# Patient Record
Sex: FEMALE | Race: WHITE | NOT HISPANIC OR LATINO | Employment: OTHER | URBAN - NONMETROPOLITAN AREA
[De-identification: names, ages, dates, MRNs, and addresses within clinical notes are randomized per-mention and may not be internally consistent; named-entity substitution may affect disease eponyms.]

---

## 2022-06-17 ENCOUNTER — TELEPHONE (OUTPATIENT)
Dept: FAMILY MEDICINE CLINIC | Facility: CLINIC | Age: 67
End: 2022-06-17

## 2022-06-17 RX ORDER — LEVOTHYROXINE SODIUM 112 UG/1
112 TABLET ORAL DAILY
Qty: 90 TABLET | Refills: 0 | Status: SHIPPED | OUTPATIENT
Start: 2022-06-17 | End: 2022-09-28 | Stop reason: SDUPTHER

## 2022-06-17 RX ORDER — LEVOTHYROXINE SODIUM 112 UG/1
112 TABLET ORAL DAILY
COMMUNITY
End: 2022-06-17 | Stop reason: SDUPTHER

## 2022-06-17 RX ORDER — CELECOXIB 200 MG/1
200 CAPSULE ORAL DAILY
COMMUNITY
End: 2022-06-17 | Stop reason: SDUPTHER

## 2022-06-17 RX ORDER — CELECOXIB 200 MG/1
200 CAPSULE ORAL DAILY
Qty: 90 CAPSULE | Refills: 0 | Status: SHIPPED | OUTPATIENT
Start: 2022-06-17 | End: 2022-09-28 | Stop reason: SDUPTHER

## 2022-09-27 RX ORDER — LEVOTHYROXINE SODIUM 112 UG/1
112 TABLET ORAL DAILY
Qty: 90 TABLET | Refills: 0 | OUTPATIENT
Start: 2022-09-27

## 2022-09-27 RX ORDER — CELECOXIB 200 MG/1
200 CAPSULE ORAL DAILY
Qty: 90 CAPSULE | Refills: 0 | OUTPATIENT
Start: 2022-09-27

## 2022-09-27 NOTE — TELEPHONE ENCOUNTER
Caller: Kyra Nunez    Relationship: Self    Best call back number: [phone]     Requested Prescriptions:   Requested Prescriptions     Pending Prescriptions Disp Refills   • celecoxib (CeleBREX) 200 MG capsule 90 capsule 0     Sig: Take 1 capsule by mouth Daily.   • levothyroxine (SYNTHROID, LEVOTHROID) 112 MCG tablet 90 tablet 0     Sig: Take 1 tablet by mouth Daily.        Pharmacy where request should be sent: [facility] #[id number] - [place], [place] - [address] [facility]  [address] [facility] - [phone] [facility] [phone]      Additional details provided by patient: PATIENT ALSO STATED THAT SHE NEEDED A REFILL FOR SUCRALFATE.     Does the patient have less than a 3 day supply:  [x] Yes  [] No    Rene Henriquez Rep   09/27/22 09:47 EDT

## 2022-09-28 ENCOUNTER — TELEPHONE (OUTPATIENT)
Dept: FAMILY MEDICINE CLINIC | Facility: CLINIC | Age: 67
End: 2022-09-28

## 2022-09-28 RX ORDER — LEVOTHYROXINE SODIUM 112 UG/1
112 TABLET ORAL DAILY
Qty: 30 TABLET | Refills: 0 | Status: SHIPPED | OUTPATIENT
Start: 2022-09-28 | End: 2022-11-16 | Stop reason: SDUPTHER

## 2022-09-28 RX ORDER — CELECOXIB 200 MG/1
200 CAPSULE ORAL DAILY
Qty: 30 CAPSULE | Refills: 0 | Status: SHIPPED | OUTPATIENT
Start: 2022-09-28 | End: 2022-11-16 | Stop reason: SDUPTHER

## 2022-09-28 NOTE — TELEPHONE ENCOUNTER
Caller: Kyra Nunez    Relationship: Self    Best call back number: [phone]    What is the best time to reach you: ANY     Who are you requesting to speak with (clinical staff, provider,  specific staff member): CLINICAL       What was th e call regarding: CHECKING ON MEDICATION REFILL. TOLD THAT IT CAN TAKE UP TO 48 HOURS     Do you require a callback: NO

## 2022-11-14 NOTE — TELEPHONE ENCOUNTER
Caller: Kyra Nunez    Relationship: Self    Best call back number: [phone]    Requested Prescriptions:   Requested Prescriptions     Pending Prescriptions Disp Refills   • celecoxib (CeleBREX) 200 MG capsule 30 capsule 0     Sig: Take 1 capsule by mouth Daily.   • levothyroxine (SYNTHROID, LEVOTHROID) 112 MCG tablet 30 tablet 0     Sig: Take 1 tablet by mouth Daily.        Pharmacy where request should be sent: [facility] #[id number] [address]  [address] - [phone] [facility] [phone]      Additional details provided by patient:     Does the patient have less than a 3 day supply:  [] Yes  [] No    Rene Garcia Rep   11/14/22 15:58 EST

## 2022-11-15 RX ORDER — LEVOTHYROXINE SODIUM 112 UG/1
112 TABLET ORAL DAILY
Qty: 30 TABLET | Refills: 0 | OUTPATIENT
Start: 2022-11-15

## 2022-11-15 RX ORDER — CELECOXIB 200 MG/1
200 CAPSULE ORAL DAILY
Qty: 30 CAPSULE | Refills: 0 | OUTPATIENT
Start: 2022-11-15

## 2022-11-16 ENCOUNTER — TELEPHONE (OUTPATIENT)
Dept: FAMILY MEDICINE CLINIC | Facility: CLINIC | Age: 67
End: 2022-11-16

## 2022-11-16 RX ORDER — CELECOXIB 200 MG/1
200 CAPSULE ORAL DAILY
Qty: 60 CAPSULE | Refills: 0 | Status: SHIPPED | OUTPATIENT
Start: 2022-11-16 | End: 2022-12-06 | Stop reason: SDUPTHER

## 2022-11-16 RX ORDER — LEVOTHYROXINE SODIUM 112 UG/1
112 TABLET ORAL DAILY
Qty: 60 TABLET | Refills: 0 | Status: SHIPPED | OUTPATIENT
Start: 2022-11-16 | End: 2022-12-06 | Stop reason: SDUPTHER

## 2022-11-16 NOTE — TELEPHONE ENCOUNTER
Patient is still waiting on her refill of levothyroxine and celecoxib. She would also like a confirmation call that it has been sent in.

## 2022-11-17 ENCOUNTER — TELEPHONE (OUTPATIENT)
Dept: FAMILY MEDICINE CLINIC | Facility: CLINIC | Age: 67
End: 2022-11-17

## 2022-11-17 RX ORDER — SUCRALFATE 1 G/1
1 TABLET ORAL 4 TIMES DAILY
Qty: 120 TABLET | Refills: 0 | Status: SHIPPED | OUTPATIENT
Start: 2022-11-17 | End: 2022-12-06

## 2022-11-17 NOTE — TELEPHONE ENCOUNTER
PT SAYS THAT SHE NEEDS THIS REFILLED  PLEASE..SUCRALFATE 1 GM QD 4 TIMES A DAY W/MEALS AND AT BEDTIME .COMPLETELY OUT ..

## 2022-11-17 NOTE — TELEPHONE ENCOUNTER
I sent in a refill on her Carafate.    Please have her keep her scheduled appointment with us for December 6, 2022.

## 2022-12-06 ENCOUNTER — OFFICE VISIT (OUTPATIENT)
Dept: FAMILY MEDICINE CLINIC | Facility: CLINIC | Age: 67
End: 2022-12-06

## 2022-12-06 VITALS
OXYGEN SATURATION: 96 % | DIASTOLIC BLOOD PRESSURE: 90 MMHG | HEART RATE: 65 BPM | HEIGHT: 64 IN | WEIGHT: 152 LBS | BODY MASS INDEX: 25.95 KG/M2 | SYSTOLIC BLOOD PRESSURE: 130 MMHG

## 2022-12-06 DIAGNOSIS — E03.9 HYPOTHYROIDISM (ACQUIRED): ICD-10-CM

## 2022-12-06 DIAGNOSIS — K22.70 BARRETT'S ESOPHAGUS WITHOUT DYSPLASIA: ICD-10-CM

## 2022-12-06 DIAGNOSIS — M15.9 GENERALIZED OSTEOARTHRITIS OF MULTIPLE SITES: ICD-10-CM

## 2022-12-06 DIAGNOSIS — Z12.11 SCREENING FOR COLON CANCER: ICD-10-CM

## 2022-12-06 DIAGNOSIS — E78.2 HYPERLIPIDEMIA, MIXED: ICD-10-CM

## 2022-12-06 DIAGNOSIS — K58.0 IRRITABLE BOWEL SYNDROME WITH DIARRHEA: Primary | ICD-10-CM

## 2022-12-06 DIAGNOSIS — K21.9 GERD WITHOUT ESOPHAGITIS: ICD-10-CM

## 2022-12-06 PROCEDURE — 99214 OFFICE O/P EST MOD 30 MIN: CPT | Performed by: FAMILY MEDICINE

## 2022-12-06 RX ORDER — LEVOTHYROXINE SODIUM 112 UG/1
112 TABLET ORAL DAILY
Qty: 90 TABLET | Refills: 1 | Status: SHIPPED | OUTPATIENT
Start: 2022-12-06 | End: 2023-01-17 | Stop reason: SDUPTHER

## 2022-12-06 RX ORDER — FAMOTIDINE 40 MG/1
40 TABLET, FILM COATED ORAL NIGHTLY
Qty: 90 TABLET | Refills: 1 | Status: SHIPPED | OUTPATIENT
Start: 2022-12-06 | End: 2023-01-17 | Stop reason: SDUPTHER

## 2022-12-06 RX ORDER — HYOSCYAMINE SULFATE 0.12 MG/1
0.12 TABLET SUBLINGUAL EVERY 4 HOURS PRN
Qty: 30 EACH | Refills: 5 | Status: SHIPPED | OUTPATIENT
Start: 2022-12-06 | End: 2023-01-17

## 2022-12-06 RX ORDER — OMEPRAZOLE 40 MG/1
40 CAPSULE, DELAYED RELEASE ORAL
Qty: 90 CAPSULE | Refills: 1 | Status: SHIPPED | OUTPATIENT
Start: 2022-12-06 | End: 2023-01-17 | Stop reason: SDUPTHER

## 2022-12-06 RX ORDER — CELECOXIB 200 MG/1
200 CAPSULE ORAL DAILY
Qty: 90 CAPSULE | Refills: 1 | Status: SHIPPED | OUTPATIENT
Start: 2022-12-06 | End: 2023-01-17 | Stop reason: SDUPTHER

## 2022-12-06 NOTE — ASSESSMENT & PLAN NOTE
Continue with statin treatment.  Will get fasting blood work up-to-date at follow-up and update her medication list at follow-up.    Lipid abnormalities are improving with treatment.  Pharmacotherapy as ordered.  Lipids will be reassessed Follow-up in 6 weeks for annual wellness visit and fasting labs.

## 2022-12-06 NOTE — ASSESSMENT & PLAN NOTE
Discussed further work-up and treatment.  She is interested in Cologuard given she declines endoscopic work-up at this time.    Given Levsin sublingual to use for cramping and bloating and restart treatment for GERD with omeprazole and discussed this is also important given Cole's history.  Also given famotidine at nighttime.    Recheck in 4 to 6 weeks for annual wellness visit or sooner problems arise.

## 2022-12-06 NOTE — PROGRESS NOTES
"Chief Complaint  Med Refill (Patient is waiting to discuss pain medication ) and Abdominal Pain (Patient would like to discuss IBS)    Subjective    History of Present Illness:  Kyra Nunez is a 67 y.o. female who presents today for follow-up regarding ongoing problems with crampy abdominal pain followed by diarrhea and upset stomach.    She has decided against repeat endoscopic work-up given some concerns with anesthesia.  She had a bad experience with anesthesia due to some underlying pneumonia that was unknown at the time that resulted in her waking up in the ICU.    No melena or hematochezia.  Past-due to get colon cancer screening done given she refuses follow-up endoscopic work-up.  She does have a history of Cole's and understands concerns with this and need for follow-up EGD but declines.    Willing to get Cologuard done for some colon cancer screening but understands this is really early detection and not screening.    Osteoarthritis remains manageable with Celebrex daily.  Failed other NSAIDs due to GI upset.    Levsin does help with abdominal cramping and bloating.    Continues on Synthroid for hypothyroidism.    Continues on lipid treatment and she is uncertain of her past lipid medication but will bring this with her to her follow-up visit and annual wellness visit next time.    Would like to restart PPI treatment for GERD.  She does have watery nonbloody diarrhea with flareups with reflux on a daily basis.  No dysphagia.    We did discuss she is past due for health maintenance and screening including DEXA, mammogram, colon cancer screening, low-dose lung CT for lung cancer screening, and vaccinations.    She will consider this at her follow-up visit for annual wellness.    Objective   Vital Signs:   /90 (BP Location: Left arm, Patient Position: Sitting, Cuff Size: Adult)   Pulse 65   Ht 162.6 cm (64\")   Wt 68.9 kg (152 lb)   SpO2 96%   BMI 26.09 kg/m²     Review of Systems "   Constitutional: Negative for appetite change, chills and fever.   HENT: Negative for hearing loss.    Eyes: Negative for blurred vision.   Respiratory: Negative for chest tightness.    Cardiovascular: Negative for chest pain.   Gastrointestinal: Positive for abdominal pain, diarrhea, nausea, GERD and indigestion. Negative for anal bleeding and blood in stool.   Musculoskeletal: Positive for arthralgias. Negative for gait problem.   Skin: Negative for rash.   Psychiatric/Behavioral: Negative for depressed mood.       Past History:  Medical History: has a past medical history of Cole's esophagus, Depressive disorder, Heavy menses, Hypothyroidism (acquired), IBS (irritable bowel syndrome), and Left breast lump.   Surgical History: has no past surgical history on file.   Family History: family history includes Lupus in her sister.   Social History: reports that she has been smoking cigarettes. She has a 50.00 pack-year smoking history. She does not have any smokeless tobacco history on file. She reports that she does not drink alcohol and does not use drugs.      Current Outpatient Medications:   •  celecoxib (CeleBREX) 200 MG capsule, Take 1 capsule by mouth Daily., Disp: 90 capsule, Rfl: 1  •  levothyroxine (SYNTHROID, LEVOTHROID) 112 MCG tablet, Take 1 tablet by mouth Daily., Disp: 90 tablet, Rfl: 1  •  famotidine (Pepcid) 40 MG tablet, Take 1 tablet by mouth Every Night. For stomach, Disp: 90 tablet, Rfl: 1  •  Hyoscyamine Sulfate SL (Levsin/SL) 0.125 MG sublingual tablet, Place 0.125 mg under the tongue Every 4 (Four) Hours As Needed (abdominal cramping/bloating). Caution constipation, Disp: 30 each, Rfl: 5  •  omeprazole (priLOSEC) 40 MG capsule, Take 1 capsule by mouth Every Morning Before Breakfast., Disp: 90 capsule, Rfl: 1    Allergies: Codeine, Copper-containing compounds, and Penicillins    Physical Exam  Constitutional:       Appearance: Normal appearance.   HENT:      Head: Normocephalic.      Right  Ear: External ear normal.      Left Ear: External ear normal.      Nose: Nose normal.   Eyes:      Pupils: Pupils are equal, round, and reactive to light.   Cardiovascular:      Rate and Rhythm: Normal rate and regular rhythm.      Heart sounds: Normal heart sounds.   Pulmonary:      Effort: Pulmonary effort is normal.      Breath sounds: Normal breath sounds.   Abdominal:      General: Abdomen is flat. Bowel sounds are normal.      Palpations: Abdomen is soft.   Musculoskeletal:         General: Normal range of motion.      Cervical back: Normal range of motion.   Skin:     General: Skin is warm and dry.   Neurological:      General: No focal deficit present.      Mental Status: She is alert.   Psychiatric:         Mood and Affect: Mood normal.         Behavior: Behavior normal.         Thought Content: Thought content normal.          Result Review                   Assessment and Plan  Diagnoses and all orders for this visit:    1. Irritable bowel syndrome with diarrhea (Primary)  Assessment & Plan:  Discussed further work-up and treatment.  She is interested in Cologuard given she declines endoscopic work-up at this time.    Given Levsin sublingual to use for cramping and bloating and restart treatment for GERD with omeprazole and discussed this is also important given Cole's history.  Also given famotidine at nighttime.    Recheck in 4 to 6 weeks for annual wellness visit or sooner problems arise.    Orders:  -     Hyoscyamine Sulfate SL (Levsin/SL) 0.125 MG sublingual tablet; Place 0.125 mg under the tongue Every 4 (Four) Hours As Needed (abdominal cramping/bloating). Caution constipation  Dispense: 30 each; Refill: 5    2. Hyperlipidemia, mixed  Assessment & Plan:  Continue with statin treatment.  Will get fasting blood work up-to-date at follow-up and update her medication list at follow-up.    Lipid abnormalities are improving with treatment.  Pharmacotherapy as ordered.  Lipids will be reassessed  Follow-up in 6 weeks for annual wellness visit and fasting labs.      3. Generalized osteoarthritis of multiple sites  Assessment & Plan:  Stable controlled Celebrex.  Refilled    Orders:  -     celecoxib (CeleBREX) 200 MG capsule; Take 1 capsule by mouth Daily.  Dispense: 90 capsule; Refill: 1    4. Hypothyroidism (acquired)  Assessment & Plan:  Continue Synthroid    Orders:  -     levothyroxine (SYNTHROID, LEVOTHROID) 112 MCG tablet; Take 1 tablet by mouth Daily.  Dispense: 90 tablet; Refill: 1    5. GERD without esophagitis  Assessment & Plan:  See above    Orders:  -     omeprazole (priLOSEC) 40 MG capsule; Take 1 capsule by mouth Every Morning Before Breakfast.  Dispense: 90 capsule; Refill: 1  -     famotidine (Pepcid) 40 MG tablet; Take 1 tablet by mouth Every Night. For stomach  Dispense: 90 tablet; Refill: 1    6. Cole's esophagus without dysplasia  -     omeprazole (priLOSEC) 40 MG capsule; Take 1 capsule by mouth Every Morning Before Breakfast.  Dispense: 90 capsule; Refill: 1  -     famotidine (Pepcid) 40 MG tablet; Take 1 tablet by mouth Every Night. For stomach  Dispense: 90 tablet; Refill: 1    7. Screening for colon cancer  -     Cologuard - Stool, Per Rectum; Future      BMI is >= 25 and <30. (Overweight) The following options were offered after discussion;: exercise counseling/recommendations and nutrition counseling/recommendations          Follow Up  Return in about 6 weeks (around 1/17/2023) for Annual physical, Medicare Wellness, Med recheck.    Theron Cooper MD

## 2023-01-17 ENCOUNTER — OFFICE VISIT (OUTPATIENT)
Dept: FAMILY MEDICINE CLINIC | Facility: CLINIC | Age: 68
End: 2023-01-17
Payer: MEDICARE

## 2023-01-17 VITALS
HEIGHT: 64 IN | DIASTOLIC BLOOD PRESSURE: 82 MMHG | BODY MASS INDEX: 26.12 KG/M2 | OXYGEN SATURATION: 94 % | SYSTOLIC BLOOD PRESSURE: 124 MMHG | HEART RATE: 60 BPM | WEIGHT: 153 LBS

## 2023-01-17 DIAGNOSIS — E03.9 HYPOTHYROIDISM (ACQUIRED): Chronic | ICD-10-CM

## 2023-01-17 DIAGNOSIS — K21.00 GASTROESOPHAGEAL REFLUX DISEASE WITH ESOPHAGITIS WITHOUT HEMORRHAGE: ICD-10-CM

## 2023-01-17 DIAGNOSIS — Z11.59 NEED FOR HEPATITIS C SCREENING TEST: ICD-10-CM

## 2023-01-17 DIAGNOSIS — Z13.820 SCREENING FOR OSTEOPOROSIS: ICD-10-CM

## 2023-01-17 DIAGNOSIS — K58.0 IRRITABLE BOWEL SYNDROME WITH DIARRHEA: Chronic | ICD-10-CM

## 2023-01-17 DIAGNOSIS — E78.2 HYPERLIPIDEMIA, MIXED: Chronic | ICD-10-CM

## 2023-01-17 DIAGNOSIS — Z23 NEEDS FLU SHOT: ICD-10-CM

## 2023-01-17 DIAGNOSIS — Z12.2 ENCOUNTER FOR SCREENING FOR LUNG CANCER: ICD-10-CM

## 2023-01-17 DIAGNOSIS — Z00.00 GENERAL MEDICAL EXAM: Primary | ICD-10-CM

## 2023-01-17 DIAGNOSIS — M15.9 GENERALIZED OSTEOARTHRITIS OF MULTIPLE SITES: Chronic | ICD-10-CM

## 2023-01-17 DIAGNOSIS — K22.70 BARRETT'S ESOPHAGUS WITHOUT DYSPLASIA: ICD-10-CM

## 2023-01-17 DIAGNOSIS — Z23 NEED FOR PROPHYLACTIC VACCINATION AGAINST STREPTOCOCCUS PNEUMONIAE (PNEUMOCOCCUS): ICD-10-CM

## 2023-01-17 DIAGNOSIS — Z87.891 PERSONAL HISTORY OF TOBACCO USE: ICD-10-CM

## 2023-01-17 DIAGNOSIS — Z12.31 SCREENING MAMMOGRAM FOR BREAST CANCER: ICD-10-CM

## 2023-01-17 PROCEDURE — 90677 PCV20 VACCINE IM: CPT | Performed by: FAMILY MEDICINE

## 2023-01-17 PROCEDURE — 1170F FXNL STATUS ASSESSED: CPT | Performed by: FAMILY MEDICINE

## 2023-01-17 PROCEDURE — 96160 PT-FOCUSED HLTH RISK ASSMT: CPT | Performed by: FAMILY MEDICINE

## 2023-01-17 PROCEDURE — G0402 INITIAL PREVENTIVE EXAM: HCPCS | Performed by: FAMILY MEDICINE

## 2023-01-17 PROCEDURE — 1159F MED LIST DOCD IN RCRD: CPT | Performed by: FAMILY MEDICINE

## 2023-01-17 PROCEDURE — 99397 PER PM REEVAL EST PAT 65+ YR: CPT | Performed by: FAMILY MEDICINE

## 2023-01-17 PROCEDURE — 90662 IIV NO PRSV INCREASED AG IM: CPT | Performed by: FAMILY MEDICINE

## 2023-01-17 PROCEDURE — G0008 ADMIN INFLUENZA VIRUS VAC: HCPCS | Performed by: FAMILY MEDICINE

## 2023-01-17 PROCEDURE — G0009 ADMIN PNEUMOCOCCAL VACCINE: HCPCS | Performed by: FAMILY MEDICINE

## 2023-01-17 PROCEDURE — 99213 OFFICE O/P EST LOW 20 MIN: CPT | Performed by: FAMILY MEDICINE

## 2023-01-17 RX ORDER — DEXTROMETHORPHAN HYDROBROMIDE AND PROMETHAZINE HYDROCHLORIDE 15; 6.25 MG/5ML; MG/5ML
5 SYRUP ORAL 4 TIMES DAILY PRN
Qty: 240 ML | Refills: 3 | Status: SHIPPED | OUTPATIENT
Start: 2023-01-17

## 2023-01-17 RX ORDER — OMEPRAZOLE 40 MG/1
40 CAPSULE, DELAYED RELEASE ORAL
Qty: 90 CAPSULE | Refills: 1 | Status: SHIPPED | OUTPATIENT
Start: 2023-01-17 | End: 2023-03-21

## 2023-01-17 RX ORDER — LEVOTHYROXINE SODIUM 112 UG/1
112 TABLET ORAL DAILY
Qty: 90 TABLET | Refills: 1 | Status: SHIPPED | OUTPATIENT
Start: 2023-01-17

## 2023-01-17 RX ORDER — FAMOTIDINE 40 MG/1
40 TABLET, FILM COATED ORAL NIGHTLY
Qty: 90 TABLET | Refills: 1 | Status: SHIPPED | OUTPATIENT
Start: 2023-01-17

## 2023-01-17 RX ORDER — CELECOXIB 200 MG/1
200 CAPSULE ORAL DAILY
Qty: 90 CAPSULE | Refills: 1 | Status: SHIPPED | OUTPATIENT
Start: 2023-01-17

## 2023-01-17 NOTE — PROGRESS NOTES
QUICK REFERENCE INFORMATION:  The ABCs of the Annual Wellness Visit    Subsequent Medicare Wellness Visit    @[email or web address]@    HEALTH RISK ASSESSMENT    1955    Recent Hospitalizations:  No hospitalization(s) within the last year..        Current Medical Providers:  Patient Care Team:  Theron Cooper MD as PCP - General (Family Medicine)        Smoking Status:  Social History     Tobacco Use   Smoking Status Every Day   • Packs/day: 1.00   • Years: 50.00   • Pack years: 50.00   • Types: Cigarettes   Smokeless Tobacco Never       Alcohol Consumption:  Social History     Substance and Sexual Activity   Alcohol Use Never       Depression Screen:   PHQ-2/PHQ-9 Depression Screening 12/6/2022   Little Interest or Pleasure in Doing Things 0-->not at all   Feeling Down, Depressed or Hopeless 0-->not at all   PHQ-9: Brief Depression Severity Measure Score 0       Health Habits and Functional and Cognitive Screening:  Functional & Cognitive Status 1/17/2023   Do you have difficulty preparing food and eating? No   Do you have difficulty bathing yourself, getting dressed or grooming yourself? No   Do you have difficulty using the toilet? No   Do you have difficulty moving around from place to place? No   Do you have trouble with steps or getting out of a bed or a chair? No   Current Diet Other   Dental Exam Not up to date   Eye Exam Up to date   Exercise (times per week) 0 times per week   Do you need help using the phone?  No   Are you deaf or do you have serious difficulty hearing?  No   Do you need help with transportation? No   Do you need help shopping? No   Do you need help preparing meals?  No   Do you need help with housework?  No   Do you need help with laundry? No   Do you need help taking your medications? No   Do you need help managing money? No   Do you ever drive or ride in a car without wearing a seat belt? No   Have you felt unusual stress, anger or loneliness in the last month? No   Who do you live  with? Spouse   If you need help, do you have trouble finding someone available to you? No   Have you been bothered in the last four weeks by sexual problems? No   Do you have difficulty concentrating, remembering or making decisions? No       Fall Risk Screen:  ZHANG Fall Risk Assessment was completed, and patient is at LOW risk for falls.Assessment completed on:1/17/2023    ACE III MINI        Does the patient have evidence of cognitive impairment? No    Aspirin use counseling: Does not need ASA (and currently is not on it)    Recent Lab Results:  CMP:     HbA1c:  No results found for: HGBA1C  Microalbumin:  No results found for: MICROALBUR, POCMALB, POCCREAT  Lipid Panel  No results found for: CHOL, TRIG, HDL, LDL, AST, ALT    Visual Acuity:  No results found.    Age-appropriate Screening Schedule:  Refer to the list below for future screening recommendations based on patient's age, sex and/or medical conditions. Orders for these recommended tests are listed in the plan section. The patient has been provided with a written plan.    Health Maintenance   Topic Date Due   • LIPID PANEL  Never done   • DXA SCAN  01/17/2023 (Originally 3/6/2008)   • MAMMOGRAM  01/17/2024 (Originally 9/8/2013)   • INFLUENZA VACCINE  Completed   • TDAP/TD VACCINES  Discontinued   • ZOSTER VACCINE  Discontinued        Subjective   History of Present Illness    Kyra Nunez is a 67 y.o. female who presents for her welcome to Medicare annual wellness visit and physical exam.    Still having upper GI symptoms despite addition of famotidine to omeprazole and use of Levsin for cramping and bloating.  She does have a history of past Cole's and required dilation with prior EGD.  She would like referral back to gastroenterology to discuss ongoing abdominal symptoms and consider EGD with dilation.    She continues to smoke and will not quit.  She has been reluctant in the past for low-dose lung CT but is willing to get that scheduled!    We  did discuss past-due for mammogram and bone density evaluation and she declines at this time but will consider in the future.    Discussed vaccination options and she is willing for flu shot and Prevnar 20 today.  She declines Tdap, Shingrix, and COVID booster.    Discussed and encouraged hep C screening and she would like that today.    Osteoarthritis stable with Celebrex.    Continues on Synthroid for hypothyroidism.    Mild stomach upset and occasional cough at nighttime and would like some Phenergan DM for cough congestion.    Declines colonoscopy.  Negative Cologuard December 2022.  Plans for repeat Cologuard in December 2025.    She does not have an advance directive but will consider    CHRONIC CONDITIONS    The following portions of the patient's history were reviewed and updated as appropriate: allergies, current medications, past family history, past medical history, past social history, past surgical history and problem list.    Outpatient Medications Prior to Visit   Medication Sig Dispense Refill   • celecoxib (CeleBREX) 200 MG capsule Take 1 capsule by mouth Daily. 90 capsule 1   • famotidine (Pepcid) 40 MG tablet Take 1 tablet by mouth Every Night. For stomach 90 tablet 1   • Hyoscyamine Sulfate SL (Levsin/SL) 0.125 MG sublingual tablet Place 0.125 mg under the tongue Every 4 (Four) Hours As Needed (abdominal cramping/bloating). Caution constipation 30 each 5   • levothyroxine (SYNTHROID, LEVOTHROID) 112 MCG tablet Take 1 tablet by mouth Daily. 90 tablet 1   • omeprazole (priLOSEC) 40 MG capsule Take 1 capsule by mouth Every Morning Before Breakfast. 90 capsule 1     No facility-administered medications prior to visit.       Patient Active Problem List   Diagnosis   • Hyperlipidemia, mixed   • Generalized osteoarthritis of multiple sites   • Irritable bowel syndrome with diarrhea   • Hypothyroidism (acquired)   • GERD without esophagitis   • Cole's esophagus without dysplasia   • Screening for  colon cancer   • General medical exam   • Screening mammogram for breast cancer   • Screening for osteoporosis   • Personal history of tobacco use   • Encounter for screening for lung cancer       Advance Care Planning:  ACP discussion was held with the patient during this visit. Patient does not have an advance directive, information provided.    Identification of Risk Factors:  Risk factors include: Advance Directive Discussion  Breast Cancer/Mammogram Screening  Colon Cancer Screening  Fall Risk  Immunizations Discussed/Encouraged (specific immunizations; Influenza and Prevnar 20 (Pneumococcal 20-valent conjugate) )  Lung Cancer Risk.    Review of Systems   Constitutional: Negative for appetite change, chills, fever and unexpected weight change.   HENT: Negative for hearing loss.    Eyes: Negative for visual disturbance.   Respiratory: Positive for cough. Negative for chest tightness, shortness of breath and wheezing.    Cardiovascular: Negative for chest pain, palpitations and leg swelling.   Gastrointestinal: Positive for abdominal pain. Negative for constipation and diarrhea.        Upper abdominal pain and GERD symptoms despite PPI and Pepcid.  Willing for repeat GI eval given history of Cole's on prior EGD   Musculoskeletal: Positive for arthralgias. Negative for back pain and gait problem.   Skin: Negative for rash.   Neurological: Negative for dizziness and headaches.   Psychiatric/Behavioral: Negative for agitation and confusion. The patient is not nervous/anxious.        Compared to one year ago, the patient feels her physical health is the same.  Compared to one year ago, the patient feels her mental health is the same.    Objective     Physical Exam  Constitutional:       Appearance: Normal appearance.   HENT:      Head: Normocephalic.      Right Ear: External ear normal.      Left Ear: External ear normal.      Nose: Nose normal.   Eyes:      Pupils: Pupils are equal, round, and reactive to light.  "  Cardiovascular:      Rate and Rhythm: Normal rate and regular rhythm.      Heart sounds: Normal heart sounds.   Pulmonary:      Effort: Pulmonary effort is normal.      Breath sounds: Normal breath sounds.   Abdominal:      General: Abdomen is flat. Bowel sounds are normal.      Palpations: Abdomen is soft.   Musculoskeletal:      Cervical back: Normal range of motion.      Comments: Generalized osteoarthritis stable with Celebrex.   Skin:     General: Skin is warm and dry.   Neurological:      General: No focal deficit present.      Mental Status: She is alert.   Psychiatric:         Mood and Affect: Mood normal.         Behavior: Behavior normal.         Thought Content: Thought content normal.          Procedures     Vitals:    01/17/23 0928   BP: 124/82   BP Location: Left arm   Patient Position: Sitting   Cuff Size: Adult   Pulse: 60   SpO2: 94%   Weight: 69.4 kg (153 lb)   Height: 162.6 cm (64\")       BMI is >= 25 and <30. (Overweight) The following options were offered after discussion;: weight loss educational material (shared in after visit summary), exercise counseling/recommendations and nutrition counseling/recommendations      No results found for: WBC, HGB, HCT, MCV, PLT  No results found for: GLUCOSE, BUN, CREATININE, EGFRIFNONA, EGFRIFAFRI, BCR, K, CO2, CALCIUM, PROTENTOTREF, ALBUMIN, LABIL2, BILIRUBIN, AST, ALT  No results found for: TSH  No results found for: PSA  No results found for: CHOL, CHLPL, TRIG, HDL, LDL, LDLDIRECT    Assessment & Plan   Problem List Items Addressed This Visit        Cardiac and Vasculature    Hyperlipidemia, mixed (Chronic)    Current Assessment & Plan     Lipid abnormalities are improving with treatment.  Pharmacotherapy as ordered.  Lipids will be reassessed in 6 months.         Relevant Orders    CBC Auto Differential    Comprehensive Metabolic Panel    Lipid Panel    TSH    T4, Free       Endocrine and Metabolic    Hypothyroidism (acquired) (Chronic)    Current " Assessment & Plan     Continue Synthroid         Relevant Medications    levothyroxine (SYNTHROID, LEVOTHROID) 112 MCG tablet    Other Relevant Orders    CBC Auto Differential    Comprehensive Metabolic Panel    Lipid Panel    TSH    T4, Free       Gastrointestinal Abdominal     Irritable bowel syndrome with diarrhea (Chronic)    Current Assessment & Plan     Scheduling GI consultation given ongoing symptoms.  Declines colonoscopy will consider EGD.  Cologuard normal in December         GERD without esophagitis (Chronic)    Current Assessment & Plan     Discussed ongoing symptoms despite combination of omeprazole and famotidine along with as needed Levsin.    History of Cole's with EGD requiring dilation.    Continues to smoke and will not quit.    Schedule GI consultation to consider EGD with possible dilation         Relevant Medications    omeprazole (priLOSEC) 40 MG capsule    famotidine (Pepcid) 40 MG tablet    Cole's esophagus without dysplasia (Chronic)    Current Assessment & Plan     See above scheduling GI consultation         Relevant Medications    omeprazole (priLOSEC) 40 MG capsule    famotidine (Pepcid) 40 MG tablet    Other Relevant Orders    Ambulatory Referral to Gastroenterology       Health Encounters    General medical exam - Primary    Current Assessment & Plan     Reviewed together health maintenance and screening along with vaccination options at her annual wellness visit today.    Will get fasting blood work up-to-date.    She does continue on past lipid treatment medication but does not have the prescription with her for refills.  She will call for refills.    Continue Celebrex for osteoarthritis.  Continue Synthroid for hypothyroidism.  Continue omeprazole and famotidine for GERD.  Scheduling consultation with GI given history of Cole's and need for EGD with possible dilation.    Scheduling past-due low-dose lung CT for lung cancer screening.    Updated vaccinations with flu and  Prevnar 20.    Declines COVID booster, Shingrix, and Tdap.    Will continue to encourage past-due mammogram and DEXA at follow-up.    Smoking cessation advised    Cologuard up-to-date            Musculoskeletal and Injuries    Generalized osteoarthritis of multiple sites (Chronic)    Current Assessment & Plan     Continue Celebrex         Relevant Medications    celecoxib (CeleBREX) 200 MG capsule    Screening for osteoporosis    Current Assessment & Plan     Declines.  We will continue to encourage a follow-up            Pulmonary and Pneumonias    Encounter for screening for lung cancer    Relevant Orders     CT Chest Low Dose Cancer Screening WO       Tobacco    Personal history of tobacco use    Relevant Orders     CT Chest Low Dose Cancer Screening WO       Other    Screening mammogram for breast cancer    Current Assessment & Plan     Declines.  We will continue to encourage a follow-up        Other Visit Diagnoses     Needs flu shot        Relevant Orders    Fluzone High-Dose 65+yrs (4175-2479) (Completed)    Need for prophylactic vaccination against Streptococcus pneumoniae (pneumococcus)        Relevant Orders    Pneumococcal Conjugate Vaccine 20-Valent (PCV20) (Completed)    Need for hepatitis C screening test        Relevant Orders    HCV Antibody Rfx To Qnt PCR        Patient Self-Management and Personalized Health Advice  The patient has been provided with information about: diet and exercise and preventive services including:   · Annual Wellness Visit (AWV)  · Hepatitis C Virus Screening (beneficiaries must fall into one of the following categories to be eligible- high risk for HCV infection, born between 8399-3560, or history of blood transfusion before 1992)  · Influenza Vaccine and Administration  · Pneumococcal Vaccine and Administration.    Outpatient Encounter Medications as of 1/17/2023   Medication Sig Dispense Refill   • celecoxib (CeleBREX) 200 MG capsule Take 1 capsule by mouth Daily. 90  capsule 1   • famotidine (Pepcid) 40 MG tablet Take 1 tablet by mouth Every Night. For stomach 90 tablet 1   • levothyroxine (SYNTHROID, LEVOTHROID) 112 MCG tablet Take 1 tablet by mouth Daily. 90 tablet 1   • omeprazole (priLOSEC) 40 MG capsule Take 1 capsule by mouth Every Morning Before Breakfast. 90 capsule 1   • [DISCONTINUED] celecoxib (CeleBREX) 200 MG capsule Take 1 capsule by mouth Daily. 90 capsule 1   • [DISCONTINUED] famotidine (Pepcid) 40 MG tablet Take 1 tablet by mouth Every Night. For stomach 90 tablet 1   • [DISCONTINUED] Hyoscyamine Sulfate SL (Levsin/SL) 0.125 MG sublingual tablet Place 0.125 mg under the tongue Every 4 (Four) Hours As Needed (abdominal cramping/bloating). Caution constipation 30 each 5   • [DISCONTINUED] levothyroxine (SYNTHROID, LEVOTHROID) 112 MCG tablet Take 1 tablet by mouth Daily. 90 tablet 1   • [DISCONTINUED] omeprazole (priLOSEC) 40 MG capsule Take 1 capsule by mouth Every Morning Before Breakfast. 90 capsule 1   • promethazine-dextromethorphan (PROMETHAZINE-DM) 6.25-15 MG/5ML syrup Take 5 mL by mouth 4 (Four) Times a Day As Needed for Cough. 240 mL 3     No facility-administered encounter medications on file as of 1/17/2023.       Reviewed use of high risk medication in the elderly: yes  Reviewed for potential of harmful drug interactions in the elderly: yes    Diagnoses and all orders for this visit:    1. General medical exam (Primary)  Assessment & Plan:  Reviewed together health maintenance and screening along with vaccination options at her annual wellness visit today.    Will get fasting blood work up-to-date.    She does continue on past lipid treatment medication but does not have the prescription with her for refills.  She will call for refills.    Continue Celebrex for osteoarthritis.  Continue Synthroid for hypothyroidism.  Continue omeprazole and famotidine for GERD.  Scheduling consultation with GI given history of Cole's and need for EGD with possible  dilation.    Scheduling past-due low-dose lung CT for lung cancer screening.    Updated vaccinations with flu and Prevnar 20.    Declines COVID booster, Shingrix, and Tdap.    Will continue to encourage past-due mammogram and DEXA at follow-up.    Smoking cessation advised    Cologuard up-to-date      2. Screening mammogram for breast cancer  Assessment & Plan:  Declines.  We will continue to encourage a follow-up      3. Screening for osteoporosis  Assessment & Plan:  Declines.  We will continue to encourage a follow-up      4. Personal history of tobacco use  -      CT Chest Low Dose Cancer Screening WO; Future    5. Encounter for screening for lung cancer  -      CT Chest Low Dose Cancer Screening WO; Future    6. Hyperlipidemia, mixed  Assessment & Plan:  Lipid abnormalities are improving with treatment.  Pharmacotherapy as ordered.  Lipids will be reassessed in 6 months.    Orders:  -     CBC Auto Differential; Future  -     Comprehensive Metabolic Panel; Future  -     Lipid Panel; Future  -     TSH; Future  -     T4, Free; Future    7. Generalized osteoarthritis of multiple sites  Assessment & Plan:  Continue Celebrex    Orders:  -     celecoxib (CeleBREX) 200 MG capsule; Take 1 capsule by mouth Daily.  Dispense: 90 capsule; Refill: 1    8. Irritable bowel syndrome with diarrhea  Assessment & Plan:  Scheduling GI consultation given ongoing symptoms.  Declines colonoscopy will consider EGD.  Cologuard normal in December 9. Hypothyroidism (acquired)  Assessment & Plan:  Continue Synthroid    Orders:  -     levothyroxine (SYNTHROID, LEVOTHROID) 112 MCG tablet; Take 1 tablet by mouth Daily.  Dispense: 90 tablet; Refill: 1  -     CBC Auto Differential; Future  -     Comprehensive Metabolic Panel; Future  -     Lipid Panel; Future  -     TSH; Future  -     T4, Free; Future    10. Gastroesophageal reflux disease with esophagitis without hemorrhage  Assessment & Plan:  Discussed ongoing symptoms despite  combination of omeprazole and famotidine along with as needed Levsin.    History of Cole's with EGD requiring dilation.    Continues to smoke and will not quit.    Schedule GI consultation to consider EGD with possible dilation    Orders:  -     omeprazole (priLOSEC) 40 MG capsule; Take 1 capsule by mouth Every Morning Before Breakfast.  Dispense: 90 capsule; Refill: 1  -     famotidine (Pepcid) 40 MG tablet; Take 1 tablet by mouth Every Night. For stomach  Dispense: 90 tablet; Refill: 1  -     Ambulatory Referral to Gastroenterology    11. Cole's esophagus without dysplasia  Assessment & Plan:  See above scheduling GI consultation    Orders:  -     omeprazole (priLOSEC) 40 MG capsule; Take 1 capsule by mouth Every Morning Before Breakfast.  Dispense: 90 capsule; Refill: 1  -     famotidine (Pepcid) 40 MG tablet; Take 1 tablet by mouth Every Night. For stomach  Dispense: 90 tablet; Refill: 1  -     Ambulatory Referral to Gastroenterology    12. Needs flu shot  -     Fluzone High-Dose 65+yrs ([id number])    13. Need for prophylactic vaccination against Streptococcus pneumoniae (pneumococcus)  -     Pneumococcal Conjugate Vaccine 20-Valent (PCV20)    14. Need for hepatitis C screening test  -     HCV Antibody Rfx To Qnt PCR; Future    Other orders  -     promethazine-dextromethorphan (PROMETHAZINE-DM) 6.25-15 MG/5ML syrup; Take 5 mL by mouth 4 (Four) Times a Day As Needed for Cough.  Dispense: 240 mL; Refill: 3      Follow Up:  Return in about 6 months (around 7/17/2023) for Med recheck.     There are no Patient Instructions on file for this visit.    An After Visit Summary and PPPS with all of these plans were given to the patient.

## 2023-01-17 NOTE — ASSESSMENT & PLAN NOTE
Discussed ongoing symptoms despite combination of omeprazole and famotidine along with as needed Levsin.    History of Cole's with EGD requiring dilation.    Continues to smoke and will not quit.    Schedule GI consultation to consider EGD with possible dilation

## 2023-01-17 NOTE — ASSESSMENT & PLAN NOTE
Scheduling GI consultation given ongoing symptoms.  Declines colonoscopy will consider EGD.  Cologuard normal in December

## 2023-01-17 NOTE — ASSESSMENT & PLAN NOTE
Reviewed together health maintenance and screening along with vaccination options at her annual wellness visit today.    Will get fasting blood work up-to-date.    She does continue on past lipid treatment medication but does not have the prescription with her for refills.  She will call for refills.    Continue Celebrex for osteoarthritis.  Continue Synthroid for hypothyroidism.  Continue omeprazole and famotidine for GERD.  Scheduling consultation with GI given history of Cole's and need for EGD with possible dilation.    Scheduling past-due low-dose lung CT for lung cancer screening.    Updated vaccinations with flu and Prevnar 20.    Declines COVID booster, Shingrix, and Tdap.    Will continue to encourage past-due mammogram and DEXA at follow-up.    Smoking cessation advised    Cologuard up-to-date

## 2023-03-21 ENCOUNTER — OUTSIDE FACILITY SERVICE (OUTPATIENT)
Dept: GASTROENTEROLOGY | Facility: CLINIC | Age: 68
End: 2023-03-21
Payer: MEDICARE

## 2023-03-21 PROCEDURE — 88305 TISSUE EXAM BY PATHOLOGIST: CPT

## 2023-03-21 PROCEDURE — 43239 EGD BIOPSY SINGLE/MULTIPLE: CPT | Performed by: INTERNAL MEDICINE

## 2023-03-21 PROCEDURE — 99204 OFFICE O/P NEW MOD 45 MIN: CPT | Performed by: INTERNAL MEDICINE

## 2023-03-21 PROCEDURE — 43249 ESOPH EGD DILATION <30 MM: CPT | Performed by: INTERNAL MEDICINE

## 2023-03-21 RX ORDER — METOCLOPRAMIDE 10 MG/1
TABLET ORAL
Qty: 90 TABLET | Refills: 2 | Status: SHIPPED | OUTPATIENT
Start: 2023-03-21

## 2023-03-21 RX ORDER — OMEPRAZOLE 40 MG/1
40 CAPSULE, DELAYED RELEASE ORAL
Qty: 60 CAPSULE | Refills: 11 | Status: SHIPPED | OUTPATIENT
Start: 2023-03-21

## 2023-03-22 ENCOUNTER — LAB REQUISITION (OUTPATIENT)
Dept: LAB | Facility: HOSPITAL | Age: 68
End: 2023-03-22
Payer: MEDICARE

## 2023-03-22 DIAGNOSIS — K22.70 BARRETT'S ESOPHAGUS WITHOUT DYSPLASIA: ICD-10-CM

## 2023-03-22 DIAGNOSIS — R13.10 DYSPHAGIA, UNSPECIFIED: ICD-10-CM

## 2023-03-22 DIAGNOSIS — K21.00 GASTRO-ESOPHAGEAL REFLUX DISEASE WITH ESOPHAGITIS, WITHOUT BLEEDING: ICD-10-CM

## 2023-03-22 DIAGNOSIS — K22.89 OTHER SPECIFIED DISEASE OF ESOPHAGUS: ICD-10-CM

## 2023-03-22 DIAGNOSIS — K31.89 OTHER DISEASES OF STOMACH AND DUODENUM: ICD-10-CM

## 2023-03-22 DIAGNOSIS — R12 HEARTBURN: ICD-10-CM

## 2023-03-24 LAB — REF LAB TEST METHOD: NORMAL

## 2023-07-24 RX ORDER — METOCLOPRAMIDE 10 MG/1
TABLET ORAL
Qty: 90 TABLET | Refills: 2 | Status: SHIPPED | OUTPATIENT
Start: 2023-07-24

## 2024-01-31 NOTE — TELEPHONE ENCOUNTER
Caller: Kyra Nunez    Relationship: Self    Best call back number: [phone]    Requested Prescriptions:   hyoscyamine sulfate 0.125 mg       Pharmacy where request should be sent:  [id number] [place], [place] - [address]  [address] [facility] - [phone]  - [phone] FX        Last office visit with prescribing clinician: 1/17/2023   Last telemedicine visit with prescribing clinician: Visit date not found   Next office visit with prescribing clinician: Visit date not found     Additional details provided by patient: PT STATED THAT RX IS FOR CRAMPS AND PCP PRESCRIBED HER RX.    Does the patient have less than a 3 day supply:  [x] Yes  [] No    Would you like a call back once the refill request has been completed: [] Yes [x] No    If the office needs to give you a call back, can they leave a voicemail: [] Yes [x] No    Rene Maguire Rep   01/31/24 09:32 EST

## 2024-02-13 ENCOUNTER — TELEPHONE (OUTPATIENT)
Dept: FAMILY MEDICINE CLINIC | Facility: CLINIC | Age: 69
End: 2024-02-13
Payer: MEDICARE

## 2024-02-19 ENCOUNTER — TELEPHONE (OUTPATIENT)
Dept: FAMILY MEDICINE CLINIC | Facility: CLINIC | Age: 69
End: 2024-02-19
Payer: MEDICARE

## 2024-02-19 DIAGNOSIS — M15.9 GENERALIZED OSTEOARTHRITIS OF MULTIPLE SITES: Chronic | ICD-10-CM

## 2024-02-19 DIAGNOSIS — E03.9 HYPOTHYROIDISM (ACQUIRED): Chronic | ICD-10-CM

## 2024-02-19 DIAGNOSIS — K21.00 GASTROESOPHAGEAL REFLUX DISEASE WITH ESOPHAGITIS WITHOUT HEMORRHAGE: ICD-10-CM

## 2024-02-19 DIAGNOSIS — K22.70 BARRETT'S ESOPHAGUS WITHOUT DYSPLASIA: ICD-10-CM

## 2024-02-19 RX ORDER — FAMOTIDINE 40 MG/1
40 TABLET, FILM COATED ORAL NIGHTLY
Qty: 90 TABLET | Refills: 0 | Status: SHIPPED | OUTPATIENT
Start: 2024-02-19

## 2024-02-19 RX ORDER — CELECOXIB 200 MG/1
200 CAPSULE ORAL DAILY
Qty: 90 CAPSULE | Refills: 0 | Status: SHIPPED | OUTPATIENT
Start: 2024-02-19

## 2024-02-19 RX ORDER — LEVOTHYROXINE SODIUM 112 UG/1
112 TABLET ORAL DAILY
Qty: 90 TABLET | Refills: 0 | Status: SHIPPED | OUTPATIENT
Start: 2024-02-19

## 2024-02-19 RX ORDER — OMEPRAZOLE 40 MG/1
40 CAPSULE, DELAYED RELEASE ORAL
Qty: 180 CAPSULE | Refills: 0 | Status: SHIPPED | OUTPATIENT
Start: 2024-02-19

## 2024-02-19 NOTE — TELEPHONE ENCOUNTER
Scheduled patient for first available appointment on 3/26. She is completely out of medication and wanting to know if she can get a refill to last until this appointment

## 2024-03-26 ENCOUNTER — PATIENT MESSAGE (OUTPATIENT)
Dept: FAMILY MEDICINE CLINIC | Facility: CLINIC | Age: 69
End: 2024-03-26

## 2024-03-26 ENCOUNTER — TRANSCRIBE ORDERS (OUTPATIENT)
Dept: MAMMOGRAPHY | Facility: CLINIC | Age: 69
End: 2024-03-26
Payer: MEDICARE

## 2024-03-26 ENCOUNTER — OFFICE VISIT (OUTPATIENT)
Dept: FAMILY MEDICINE CLINIC | Facility: CLINIC | Age: 69
End: 2024-03-26
Payer: MEDICARE

## 2024-03-26 DIAGNOSIS — R73.9 HYPERGLYCEMIA: ICD-10-CM

## 2024-03-26 DIAGNOSIS — F41.1 GENERALIZED ANXIETY DISORDER WITH PANIC ATTACKS: ICD-10-CM

## 2024-03-26 DIAGNOSIS — F32.9 REACTIVE DEPRESSION: ICD-10-CM

## 2024-03-26 DIAGNOSIS — K58.0 IRRITABLE BOWEL SYNDROME WITH DIARRHEA: Chronic | ICD-10-CM

## 2024-03-26 DIAGNOSIS — E78.2 HYPERLIPIDEMIA, MIXED: Chronic | ICD-10-CM

## 2024-03-26 DIAGNOSIS — Z87.891 PERSONAL HISTORY OF TOBACCO USE: ICD-10-CM

## 2024-03-26 DIAGNOSIS — Z12.31 SCREENING MAMMOGRAM FOR BREAST CANCER: ICD-10-CM

## 2024-03-26 DIAGNOSIS — K22.70 BARRETT'S ESOPHAGUS WITHOUT DYSPLASIA: Chronic | ICD-10-CM

## 2024-03-26 DIAGNOSIS — Z12.2 ENCOUNTER FOR SCREENING FOR LUNG CANCER: ICD-10-CM

## 2024-03-26 DIAGNOSIS — Z00.00 GENERAL MEDICAL EXAM: Primary | ICD-10-CM

## 2024-03-26 DIAGNOSIS — Z12.31 ENCOUNTER FOR SCREENING MAMMOGRAM FOR MALIGNANT NEOPLASM OF BREAST: Primary | ICD-10-CM

## 2024-03-26 DIAGNOSIS — E66.09 CLASS 1 OBESITY DUE TO EXCESS CALORIES WITH SERIOUS COMORBIDITY AND BODY MASS INDEX (BMI) OF 30.0 TO 30.9 IN ADULT: ICD-10-CM

## 2024-03-26 DIAGNOSIS — K21.9 GERD WITHOUT ESOPHAGITIS: Chronic | ICD-10-CM

## 2024-03-26 DIAGNOSIS — Z11.59 NEED FOR HEPATITIS C SCREENING TEST: ICD-10-CM

## 2024-03-26 DIAGNOSIS — F41.0 GENERALIZED ANXIETY DISORDER WITH PANIC ATTACKS: ICD-10-CM

## 2024-03-26 DIAGNOSIS — G43.009 MIGRAINE WITHOUT AURA AND WITHOUT STATUS MIGRAINOSUS, NOT INTRACTABLE: ICD-10-CM

## 2024-03-26 DIAGNOSIS — Z78.0 ENCOUNTER FOR OSTEOPOROSIS SCREENING IN ASYMPTOMATIC POSTMENOPAUSAL PATIENT: ICD-10-CM

## 2024-03-26 DIAGNOSIS — Z13.820 ENCOUNTER FOR OSTEOPOROSIS SCREENING IN ASYMPTOMATIC POSTMENOPAUSAL PATIENT: ICD-10-CM

## 2024-03-26 DIAGNOSIS — M15.9 GENERALIZED OSTEOARTHRITIS OF MULTIPLE SITES: Chronic | ICD-10-CM

## 2024-03-26 DIAGNOSIS — E03.9 HYPOTHYROIDISM (ACQUIRED): Chronic | ICD-10-CM

## 2024-03-26 PROBLEM — E66.811 CLASS 1 OBESITY DUE TO EXCESS CALORIES WITH SERIOUS COMORBIDITY AND BODY MASS INDEX (BMI) OF 30.0 TO 30.9 IN ADULT: Status: ACTIVE | Noted: 2024-03-26

## 2024-03-26 RX ORDER — LEVOTHYROXINE SODIUM 112 UG/1
112 TABLET ORAL DAILY
Qty: 90 TABLET | Refills: 2 | Status: SHIPPED | OUTPATIENT
Start: 2024-03-26 | End: 2024-03-27 | Stop reason: SDUPTHER

## 2024-03-26 RX ORDER — CELECOXIB 200 MG/1
200 CAPSULE ORAL DAILY
Qty: 90 CAPSULE | Refills: 2 | Status: SHIPPED | OUTPATIENT
Start: 2024-03-26

## 2024-03-26 RX ORDER — OMEPRAZOLE 40 MG/1
40 CAPSULE, DELAYED RELEASE ORAL
Qty: 180 CAPSULE | Refills: 2 | Status: SHIPPED | OUTPATIENT
Start: 2024-03-26

## 2024-03-26 RX ORDER — ROSUVASTATIN CALCIUM 20 MG/1
20 TABLET, COATED ORAL DAILY
Qty: 90 TABLET | Refills: 2 | Status: SHIPPED | OUTPATIENT
Start: 2024-03-26

## 2024-03-26 RX ORDER — FAMOTIDINE 40 MG/1
40 TABLET, FILM COATED ORAL NIGHTLY
Qty: 90 TABLET | Refills: 2 | Status: SHIPPED | OUTPATIENT
Start: 2024-03-26

## 2024-03-26 RX ORDER — DESVENLAFAXINE SUCCINATE 50 MG/1
50 TABLET, EXTENDED RELEASE ORAL DAILY
Qty: 90 TABLET | Refills: 2 | Status: SHIPPED | OUTPATIENT
Start: 2024-03-26

## 2024-03-26 NOTE — ASSESSMENT & PLAN NOTE
Discussed flareups with depression and anxiety related to increased stressors with her  dealing with lung cancer.  She did well with Effexor in the past and is interested in trying Pristiq given she is also noted migraine flareups off Effexor.  Started on low-dose Pristiq and we will plan to recheck at follow-up or sooner problems arise.  We did discuss room to titrate dosing to 100 mg if needed.

## 2024-03-26 NOTE — ASSESSMENT & PLAN NOTE
Patient's (Body mass index is 30.04 kg/m².) indicates that they are obese (BMI >30) with health conditions that include hypertension, dyslipidemias, GERD, and osteoarthritis . Weight is unchanged. BMI  is above average; BMI management plan is completed. We discussed portion control and increasing exercise.

## 2024-03-26 NOTE — TELEPHONE ENCOUNTER
From: Kyra Nunez  To: Theron Cooper  Sent: 3/26/2024 3:04 PM EDT  Subject: Medicine    What was the name of medicine u took me off of and they put my weight down wrong in chart

## 2024-03-26 NOTE — PROGRESS NOTES
The ABCs of the Annual Wellness Visit  Subsequent Medicare Wellness Visit    Subjective    Kyra Nunez is a 68 y.o. female who presents for a Subsequent Medicare Wellness Visit.    The following portions of the patient's history were reviewed and   updated as appropriate: allergies, current medications, past family history, past medical history, past social history, past surgical history, and problem list.    Compared to one year ago, the patient feels her physical   health is the same.    Compared to one year ago, the patient feels her mental   health is the same.    Recent Hospitalizations:  She was not admitted to the hospital during the last year.       Current Medical Providers:  Patient Care Team:  Theron Cooper MD as PCP - General (Family Medicine)    Outpatient Medications Prior to Visit   Medication Sig Dispense Refill    celecoxib (CeleBREX) 200 MG capsule Take 1 capsule by mouth Daily. 90 capsule 0    famotidine (Pepcid) 40 MG tablet Take 1 tablet by mouth Every Night. For stomach 90 tablet 0    hyoscyamine (LEVSIN) 0.125 MG SL tablet Place 1 tablet under the tongue Every 4 (Four) Hours As Needed for Cramping ((Caution constipation)). 20 tablet 3    levothyroxine (SYNTHROID, LEVOTHROID) 112 MCG tablet Take 1 tablet by mouth Daily. 90 tablet 0    metoclopramide (REGLAN) 10 MG tablet TAKE 1 TABLET BY MOUTH THREE TIMES DAILY 30 MINUTES BEFORE MEALS 90 tablet 2    omeprazole (priLOSEC) 40 MG capsule Take 1 capsule by mouth 2 (Two) Times a Day Before Meals. Take a half hour before breakfast 180 capsule 0     No facility-administered medications prior to visit.       No opioid medication identified on active medication list. I have reviewed chart for other potential  high risk medication/s and harmful drug interactions in the elderly.        Aspirin is not on active medication list.  Aspirin use is not indicated based on review of current medical condition/s. Risk of harm outweighs potential  "benefits.  .    Patient Active Problem List   Diagnosis    Hyperlipidemia, mixed    Generalized osteoarthritis of multiple sites    Irritable bowel syndrome with diarrhea    Hypothyroidism (acquired)    GERD without esophagitis    Cole's esophagus without dysplasia    Screening for colon cancer    General medical exam    Screening mammogram for breast cancer    Personal history of tobacco use    Encounter for screening for lung cancer    Encounter for osteoporosis screening in asymptomatic postmenopausal patient    Hyperglycemia    Class 1 obesity due to excess calories with serious comorbidity and body mass index (BMI) of 30.0 to 30.9 in adult    Generalized anxiety disorder with panic attacks    Migraine without aura and without status migrainosus, not intractable    Reactive depression     Advance Care Planning   Advance Care Planning     Advance Directive is not on file.  ACP discussion was held with the patient during this visit. Patient does not have an advance directive, information provided.     Objective    Vitals:    03/26/24 1327   BP: 130/70   Pulse: 67   SpO2: 97%   Weight: 79.4 kg (175 lb)     Estimated body mass index is 30.04 kg/m² as calculated from the following:    Height as of 1/17/23: 162.6 cm (64\").    Weight as of this encounter: 79.4 kg (175 lb).    BMI is >= 30 and <35. (Class 1 Obesity). The following options were offered after discussion;: exercise counseling/recommendations and nutrition counseling/recommendations      Does the patient have evidence of cognitive impairment? No          HEALTH RISK ASSESSMENT    Smoking Status:  Social History     Tobacco Use   Smoking Status Every Day    Current packs/day: 1.00    Average packs/day: 1 pack/day for 50.0 years (50.0 ttl pk-yrs)    Types: Cigarettes   Smokeless Tobacco Never     Alcohol Consumption:  Social History     Substance and Sexual Activity   Alcohol Use Never     Fall Risk Screen:    ZHANG Fall Risk Assessment was completed, and " patient is at LOW risk for falls.Assessment completed on:3/26/2024    Depression Screening:      3/26/2024     1:32 PM   PHQ-2/PHQ-9 Depression Screening   Little Interest or Pleasure in Doing Things 0-->not at all   Feeling Down, Depressed or Hopeless 0-->not at all   PHQ-9: Brief Depression Severity Measure Score 0       Health Habits and Functional and Cognitive Screening:      3/26/2024     1:00 PM   Functional & Cognitive Status   Do you have difficulty preparing food and eating? No   Do you have difficulty bathing yourself, getting dressed or grooming yourself? No   Do you have difficulty using the toilet? No   Do you have difficulty moving around from place to place? No   Do you have trouble with steps or getting out of a bed or a chair? No   Current Diet Limited Junk Food   Dental Exam Up to date   Eye Exam Up to date   Exercise (times per week) 0 times per week   Current Exercises Include No Regular Exercise;Gardening   Do you need help using the phone?  No   Are you deaf or do you have serious difficulty hearing?  No   Do you need help to go to places out of walking distance? No   Do you need help shopping? No   Do you need help preparing meals?  No   Do you need help with housework?  No   Do you need help with laundry? No   Do you need help taking your medications? No   Do you need help managing money? No   Do you ever drive or ride in a car without wearing a seat belt? No   Have you felt unusual stress, anger or loneliness in the last month? No   Who do you live with? Spouse   If you need help, do you have trouble finding someone available to you? No   Have you been bothered in the last four weeks by sexual problems? No   Do you have difficulty concentrating, remembering or making decisions? Yes       Age-appropriate Screening Schedule:  Refer to the list below for future screening recommendations based on patient's age, sex and/or medical conditions. Orders for these recommended tests are listed in the  plan section. The patient has been provided with a written plan.    Health Maintenance   Topic Date Due    LIPID PANEL  Never done    DXA SCAN  03/06/2008    MAMMOGRAM  09/08/2013    HEPATITIS C SCREENING  Never done    LUNG CANCER SCREENING  03/26/2024    INFLUENZA VACCINE  03/31/2024 (Originally 8/1/2023)    ANNUAL WELLNESS VISIT  03/26/2025    COLORECTAL CANCER SCREENING  12/18/2025    Pneumococcal Vaccine 65+  Completed    COVID-19 Vaccine  Discontinued    RSV Vaccine - Adults  Discontinued    TDAP/TD VACCINES  Discontinued    ZOSTER VACCINE  Discontinued                  CMS Preventative Services Quick Reference  Risk Factors Identified During Encounter  Fall Risk-High or Moderate: Discussed Fall Prevention in the home  Immunizations Discussed/Encouraged: Influenza and RSV (Respiratory Syncytial Virus)  The above risks/problems have been discussed with the patient.  Pertinent information has been shared with the patient in the After Visit Summary.  An After Visit Summary and PPPS were made available to the patient.    Follow Up:   Next Medicare Wellness visit to be scheduled in 1 year.       Additional E&M Note during same encounter follows:  Patient has multiple medical problems which are significant and separately identifiable that require additional work above and beyond the Medicare Wellness Visit.      Chief Complaint  IBS, Hypothyroidism, OA, GERD, anxiety/stress and migraine flareups    Subjective        HPI  Kyra Nunez is also being seen today for physical exam and medication refills    Still having upper GI symptoms despite addition of famotidine to omeprazole and use of Levsin for cramping and bloating.  She does have a history of past Cole's and required dilation with prior EGD.  Had EGD 3/2023 and they added reglan but she would like to come off reglan given TD risk.  On Omeprazole and pepcid.  No dysphagia.      She continues to smoke and will not quit.  She has been reluctant in the past for  low-dose lung CT but had one last year!  Willing to get setup for past-due low dose lung CT     is struggling with lung cancer.    Increased stress at home and migraines.  Did well with effexor except side-effects in the past - interested in trying pristiq.     We did discuss past-due for mammogram and bone density evaluation and she is willing to get them setup    Declines vaccination update    Osteoarthritis stable with Celebrex.    Continues on Synthroid for hypothyroidism.    Declines colonoscopy.  Negative Cologuard December 2022.  Plans for repeat Cologuard in December 2025.    She does not have an advance directive but will consider    Review of Systems   Constitutional:  Negative for activity change, appetite change, chills, fatigue and fever.   HENT:  Negative for ear pain, hearing loss and trouble swallowing.    Eyes:  Negative for pain and visual disturbance.   Respiratory:  Negative for cough, chest tightness, shortness of breath and wheezing.    Cardiovascular:  Negative for chest pain, palpitations and leg swelling.   Gastrointestinal:  Negative for abdominal pain and blood in stool.   Genitourinary:  Negative for difficulty urinating.   Musculoskeletal:  Negative for arthralgias, back pain and joint swelling.   Skin:  Negative for rash.   Neurological:  Negative for dizziness, weakness and light-headedness.   Psychiatric/Behavioral:  Positive for dysphoric mood. Negative for agitation, behavioral problems and sleep disturbance. The patient is nervous/anxious.        Objective   Vital Signs:  /70   Pulse 67   Wt 79.4 kg (175 lb)   SpO2 97%   BMI 30.04 kg/m²     Physical Exam  Constitutional:       Appearance: Normal appearance.   HENT:      Head: Normocephalic.      Right Ear: External ear normal.      Left Ear: External ear normal.      Nose: Nose normal.   Eyes:      Pupils: Pupils are equal, round, and reactive to light.   Cardiovascular:      Rate and Rhythm: Normal rate and  regular rhythm.      Heart sounds: Normal heart sounds.   Pulmonary:      Effort: Pulmonary effort is normal.      Breath sounds: Normal breath sounds.   Musculoskeletal:         General: Normal range of motion.      Cervical back: Normal range of motion.   Skin:     General: Skin is warm and dry.   Neurological:      General: No focal deficit present.      Mental Status: She is alert.   Psychiatric:      Comments: Increased flareups with stress and migraine headaches.  Interested in restarting treatment with Pristiq given she has done well with Effexor in the past but would prefer to try something with fewer side effects.                         Assessment and Plan   Diagnoses and all orders for this visit:    1. General medical exam (Primary)  Assessment & Plan:  Discussed together health maintenance and screening along with vaccination options and healthy diet and exercise habits as part of the preventative counseling at their physical exam today.       2. Screening mammogram for breast cancer  -     Mammo Screening Digital Tomosynthesis Bilateral With CAD; Future    3. Encounter for osteoporosis screening in asymptomatic postmenopausal patient  -     DEXA Bone Density Axial; Future    4. Irritable bowel syndrome with diarrhea  Assessment & Plan:  Continue with as needed Levsin    Orders:  -     hyoscyamine (LEVSIN) 0.125 MG SL tablet; Place 1 tablet under the tongue Every 4 (Four) Hours As Needed for Cramping ((Caution constipation)).  Dispense: 30 tablet; Refill: 6    5. Hypothyroidism (acquired)  Assessment & Plan:  Continue Synthroid    Orders:  -     CBC Auto Differential; Future  -     Comprehensive Metabolic Panel; Future  -     Lipid Panel; Future  -     TSH; Future  -     T4, Free; Future  -     levothyroxine (SYNTHROID, LEVOTHROID) 112 MCG tablet; Take 1 tablet by mouth Daily.  Dispense: 90 tablet; Refill: 2  -     CBC Auto Differential  -     Comprehensive Metabolic Panel  -     Lipid Panel  -      TSH  -     T4, Free    6. Hyperlipidemia, mixed  Assessment & Plan:  She is willing to restart cholesterol       Restarted treatment with Crestor    Orders:  -     CBC Auto Differential; Future  -     Comprehensive Metabolic Panel; Future  -     Lipid Panel; Future  -     TSH; Future  -     T4, Free; Future  -     rosuvastatin (Crestor) 20 MG tablet; Take 1 tablet by mouth Daily. For cholesterol and heart protection.  Dispense: 90 tablet; Refill: 2  -     CBC Auto Differential  -     Comprehensive Metabolic Panel  -     Lipid Panel  -     TSH  -     T4, Free    7. GERD without esophagitis  Assessment & Plan:  Refilled omeprazole    Orders:  -     omeprazole (priLOSEC) 40 MG capsule; Take 1 capsule by mouth 2 (Two) Times a Day Before Meals. Take a half hour before breakfast  Dispense: 180 capsule; Refill: 2  -     famotidine (Pepcid) 40 MG tablet; Take 1 tablet by mouth Every Night. For stomach  Dispense: 90 tablet; Refill: 2    8. Generalized osteoarthritis of multiple sites  Assessment & Plan:  Continue Celebrex    Orders:  -     celecoxib (CeleBREX) 200 MG capsule; Take 1 capsule by mouth Daily.  Dispense: 90 capsule; Refill: 2    9. Cole's esophagus without dysplasia  Assessment & Plan:  EGD uptodate 3/2023    Cont omeprazole and pepcid    Off Reglan given concerns for TD long-term     Orders:  -     omeprazole (priLOSEC) 40 MG capsule; Take 1 capsule by mouth 2 (Two) Times a Day Before Meals. Take a half hour before breakfast  Dispense: 180 capsule; Refill: 2  -     famotidine (Pepcid) 40 MG tablet; Take 1 tablet by mouth Every Night. For stomach  Dispense: 90 tablet; Refill: 2    10. Personal history of tobacco use  -      CT Chest Low Dose Cancer Screening WO; Future    11. Encounter for screening for lung cancer  -      CT Chest Low Dose Cancer Screening WO; Future    12. Need for hepatitis C screening test  -     HCV Antibody Rfx To Qnt PCR; Future  -     HCV Antibody Rfx To Qnt PCR    13.  Hyperglycemia  -     Hemoglobin A1c; Future  -     Hemoglobin A1c    14. Generalized anxiety disorder with panic attacks  Assessment & Plan:  Discussed flareups with depression and anxiety related to increased stressors with her  dealing with lung cancer.  She did well with Effexor in the past and is interested in trying Pristiq given she is also noted migraine flareups off Effexor.  Started on low-dose Pristiq and we will plan to recheck at follow-up or sooner problems arise.  We did discuss room to titrate dosing to 100 mg if needed.    Orders:  -     desvenlafaxine (Pristiq) 50 MG 24 hr tablet; Take 1 tablet by mouth Daily. For stress/anxiety/depression/migraine prevention  Dispense: 90 tablet; Refill: 2    15. Migraine without aura and without status migrainosus, not intractable  Assessment & Plan:  Restarting Pristiq for depression with anxiety and migraine prevention        Orders:  -     desvenlafaxine (Pristiq) 50 MG 24 hr tablet; Take 1 tablet by mouth Daily. For stress/anxiety/depression/migraine prevention  Dispense: 90 tablet; Refill: 2    16. Reactive depression  Assessment & Plan:  Discussed flareups with depression and anxiety related to increased stressors with her  dealing with lung cancer.  She did well with Effexor in the past and is interested in trying Pristiq given she is also noted migraine flareups off Effexor.  Started on low-dose Pristiq and we will plan to recheck at follow-up or sooner problems arise.  We did discuss room to titrate dosing to 100 mg if needed.    Orders:  -     desvenlafaxine (Pristiq) 50 MG 24 hr tablet; Take 1 tablet by mouth Daily. For stress/anxiety/depression/migraine prevention  Dispense: 90 tablet; Refill: 2    17. Class 1 obesity due to excess calories with serious comorbidity and body mass index (BMI) of 30.0 to 30.9 in adult  Assessment & Plan:  Patient's (Body mass index is 30.04 kg/m².) indicates that they are obese (BMI >30) with health  conditions that include hypertension, dyslipidemias, GERD, and osteoarthritis . Weight is unchanged. BMI  is above average; BMI management plan is completed. We discussed portion control and increasing exercise.                Follow Up   Return in about 6 months (around 9/26/2024) for Med recheck, Fasting for labs at appointment (but drink water!).  Patient was given instructions and counseling regarding her condition or for health maintenance advice. Please see specific information pulled into the AVS if appropriate.

## 2024-03-27 VITALS
DIASTOLIC BLOOD PRESSURE: 70 MMHG | WEIGHT: 137 LBS | SYSTOLIC BLOOD PRESSURE: 130 MMHG | OXYGEN SATURATION: 97 % | BODY MASS INDEX: 23.52 KG/M2 | HEART RATE: 67 BPM

## 2024-03-27 DIAGNOSIS — E78.2 HYPERLIPIDEMIA, MIXED: ICD-10-CM

## 2024-03-27 DIAGNOSIS — E03.9 HYPOTHYROIDISM (ACQUIRED): Primary | ICD-10-CM

## 2024-03-27 LAB
ALBUMIN SERPL-MCNC: 4.5 G/DL (ref 3.9–4.9)
ALBUMIN/GLOB SERPL: 1.6 {RATIO} (ref 1.2–2.2)
ALP SERPL-CCNC: 101 IU/L (ref 44–121)
ALT SERPL-CCNC: 12 IU/L (ref 0–32)
AST SERPL-CCNC: 13 IU/L (ref 0–40)
BASOPHILS # BLD AUTO: 0 X10E3/UL (ref 0–0.2)
BASOPHILS NFR BLD AUTO: 1 %
BILIRUB SERPL-MCNC: 0.4 MG/DL (ref 0–1.2)
BUN SERPL-MCNC: 14 MG/DL (ref 8–27)
BUN/CREAT SERPL: 14 (ref 12–28)
CALCIUM SERPL-MCNC: 9.6 MG/DL (ref 8.7–10.3)
CHLORIDE SERPL-SCNC: 103 MMOL/L (ref 96–106)
CHOLEST SERPL-MCNC: 223 MG/DL (ref 100–199)
CO2 SERPL-SCNC: 22 MMOL/L (ref 20–29)
CREAT SERPL-MCNC: 1.01 MG/DL (ref 0.57–1)
EGFRCR SERPLBLD CKD-EPI 2021: 61 ML/MIN/1.73
EOSINOPHIL # BLD AUTO: 0.1 X10E3/UL (ref 0–0.4)
EOSINOPHIL NFR BLD AUTO: 1 %
ERYTHROCYTE [DISTWIDTH] IN BLOOD BY AUTOMATED COUNT: 15 % (ref 11.7–15.4)
GLOBULIN SER CALC-MCNC: 2.8 G/DL (ref 1.5–4.5)
GLUCOSE SERPL-MCNC: 94 MG/DL (ref 70–99)
HBA1C MFR BLD: 6.1 % (ref 4.8–5.6)
HCT VFR BLD AUTO: 45.7 % (ref 34–46.6)
HCV AB SERPL QL IA: NORMAL
HCV IGG SERPL QL IA: NON REACTIVE
HDLC SERPL-MCNC: 59 MG/DL
HGB BLD-MCNC: 15.4 G/DL (ref 11.1–15.9)
IMM GRANULOCYTES # BLD AUTO: 0 X10E3/UL (ref 0–0.1)
IMM GRANULOCYTES NFR BLD AUTO: 0 %
LDLC SERPL CALC-MCNC: 147 MG/DL (ref 0–99)
LYMPHOCYTES # BLD AUTO: 2.5 X10E3/UL (ref 0.7–3.1)
LYMPHOCYTES NFR BLD AUTO: 32 %
MCH RBC QN AUTO: 28.1 PG (ref 26.6–33)
MCHC RBC AUTO-ENTMCNC: 33.7 G/DL (ref 31.5–35.7)
MCV RBC AUTO: 83 FL (ref 79–97)
MONOCYTES # BLD AUTO: 0.4 X10E3/UL (ref 0.1–0.9)
MONOCYTES NFR BLD AUTO: 6 %
NEUTROPHILS # BLD AUTO: 4.6 X10E3/UL (ref 1.4–7)
NEUTROPHILS NFR BLD AUTO: 60 %
PLATELET # BLD AUTO: 357 X10E3/UL (ref 150–450)
POTASSIUM SERPL-SCNC: 4.4 MMOL/L (ref 3.5–5.2)
PROT SERPL-MCNC: 7.3 G/DL (ref 6–8.5)
RBC # BLD AUTO: 5.48 X10E6/UL (ref 3.77–5.28)
SODIUM SERPL-SCNC: 139 MMOL/L (ref 134–144)
T4 FREE SERPL-MCNC: 1.87 NG/DL (ref 0.82–1.77)
TRIGL SERPL-MCNC: 96 MG/DL (ref 0–149)
TSH SERPL DL<=0.005 MIU/L-ACNC: 0.1 UIU/ML (ref 0.45–4.5)
VLDLC SERPL CALC-MCNC: 17 MG/DL (ref 5–40)
WBC # BLD AUTO: 7.7 X10E3/UL (ref 3.4–10.8)

## 2024-03-27 RX ORDER — LEVOTHYROXINE SODIUM 88 UG/1
88 TABLET ORAL
Qty: 90 TABLET | Refills: 2 | Status: SHIPPED | OUTPATIENT
Start: 2024-03-27

## 2024-04-01 NOTE — PROGRESS NOTES
The message below may be given by the hub:    Please contact patient with [facility] lab result message.    There is a lab result message attached to their lab results... but I received notification that the results were not viewed within 5 days on [facility].  I need to make sure that they get their lab result message.    Thank you.

## 2024-04-11 ENCOUNTER — TELEPHONE (OUTPATIENT)
Dept: FAMILY MEDICINE CLINIC | Facility: CLINIC | Age: 69
End: 2024-04-11
Payer: MEDICARE

## 2024-04-11 NOTE — TELEPHONE ENCOUNTER
----- Message from Theron Cooper MD sent at 4/6/2024 11:54 AM EDT -----  The message below may be given by the hub:    Please contact patient with [facility] low-dose lung CT result message.    There is a result message attached to their low-dose CT results... but I received notification that the results were not viewed within 5 days on [facility].  I need to make sure that they get their  result message.    Thank you.

## 2024-04-11 NOTE — TELEPHONE ENCOUNTER
Left Message: If Pt calls back ok for Hub to relay--  Your recent mammogram returned with no findings concerning for breast cancer.     We should plan to repeat this for breast cancer screening in 1 year.   Pt has viewed message on My Chart

## 2024-04-11 NOTE — TELEPHONE ENCOUNTER
MELIAM to return call to office # [phone]     FOR HUB:   Your recent low-dose lung CT for lung cancer screening returned good with no findings concerning for lung cancer.     We should plan to repeat this for lung cancer screening in 1 year.

## 2024-04-11 NOTE — TELEPHONE ENCOUNTER
Name: Kyra Nunez      Relationship: Self      Best Callback Number:  [phone]       HUB PROVIDED THE RELAY MESSAGE FROM THE OFFICE      PATIENT: VOICED UNDERSTANDING AND HAS NO FURTHER QUESTIONS AT THIS TIME    ADDITIONAL INFORMATION: N/A

## 2024-10-28 ENCOUNTER — TELEPHONE (OUTPATIENT)
Dept: FAMILY MEDICINE CLINIC | Facility: CLINIC | Age: 69
End: 2024-10-28

## 2025-03-05 DIAGNOSIS — E78.2 HYPERLIPIDEMIA, MIXED: Chronic | ICD-10-CM

## 2025-03-05 DIAGNOSIS — E03.9 HYPOTHYROIDISM (ACQUIRED): ICD-10-CM

## 2025-03-05 DIAGNOSIS — F32.9 REACTIVE DEPRESSION: ICD-10-CM

## 2025-03-05 DIAGNOSIS — F41.1 GENERALIZED ANXIETY DISORDER WITH PANIC ATTACKS: ICD-10-CM

## 2025-03-05 DIAGNOSIS — F41.0 GENERALIZED ANXIETY DISORDER WITH PANIC ATTACKS: ICD-10-CM

## 2025-03-05 DIAGNOSIS — M15.9 GENERALIZED OSTEOARTHRITIS OF MULTIPLE SITES: Chronic | ICD-10-CM

## 2025-03-05 DIAGNOSIS — G43.009 MIGRAINE WITHOUT AURA AND WITHOUT STATUS MIGRAINOSUS, NOT INTRACTABLE: ICD-10-CM

## 2025-03-05 RX ORDER — ROSUVASTATIN CALCIUM 20 MG/1
20 TABLET, COATED ORAL DAILY
Qty: 90 TABLET | Refills: 2 | Status: SHIPPED | OUTPATIENT
Start: 2025-03-05

## 2025-03-05 RX ORDER — DESVENLAFAXINE 50 MG/1
50 TABLET, FILM COATED, EXTENDED RELEASE ORAL DAILY
Qty: 90 TABLET | Refills: 2 | Status: SHIPPED | OUTPATIENT
Start: 2025-03-05

## 2025-03-05 RX ORDER — CELECOXIB 200 MG/1
200 CAPSULE ORAL DAILY
Qty: 90 CAPSULE | Refills: 2 | Status: SHIPPED | OUTPATIENT
Start: 2025-03-05

## 2025-03-05 RX ORDER — LEVOTHYROXINE SODIUM 88 UG/1
88 TABLET ORAL
Qty: 90 TABLET | Refills: 2 | OUTPATIENT
Start: 2025-03-05

## 2025-03-05 NOTE — TELEPHONE ENCOUNTER
Called pt's wife and advised of rad't this am for labs, fluids, np, she marsha, df   Protocol failed

## 2025-03-05 NOTE — TELEPHONE ENCOUNTER
Requested Prescriptions:   Requested Prescriptions     Pending Prescriptions Disp Refills    celecoxib (CeleBREX) 200 MG capsule 90 capsule 2     Sig: Take 1 capsule by mouth Daily.    rosuvastatin (Crestor) 20 MG tablet 90 tablet 2     Sig: Take 1 tablet by mouth Daily. For cholesterol and heart protection.    desvenlafaxine (Pristiq) 50 MG 24 hr tablet 90 tablet 2     Sig: Take 1 tablet by mouth Daily. For stress/anxiety/depression/migraine prevention    levothyroxine (SYNTHROID, LEVOTHROID) 88 MCG tablet 90 tablet 2     Sig: Take 1 tablet by mouth Every Morning. (Note dose reduction to 88mcg based upon labs)        Pharmacy where request should be sent:      Last office visit with prescribing clinician: 3/26/2024   Last telemedicine visit with prescribing clinician: Visit date not found   Next office visit with prescribing clinician: Visit date not found     Additional details provided by patient: faxed from [facility]     Does the patient have less than a 3 day supply:  [] Yes  [] No    Would you like a call back once the refill request has been completed: [] Yes [] No    If the office needs to give you a call back, can they leave a voicemail: [] Yes [] No    Rene Banda Rep   03/05/25 08:45 EST

## 2025-04-18 ENCOUNTER — HOSPITAL ENCOUNTER (EMERGENCY)
Facility: HOSPITAL | Age: 70
Discharge: HOME | End: 2025-04-18
Attending: STUDENT IN AN ORGANIZED HEALTH CARE EDUCATION/TRAINING PROGRAM
Payer: MEDICARE

## 2025-04-18 ENCOUNTER — OFFICE VISIT (OUTPATIENT)
Dept: FAMILY MEDICINE CLINIC | Facility: CLINIC | Age: 70
End: 2025-04-18
Payer: MEDICARE

## 2025-04-18 ENCOUNTER — APPOINTMENT (OUTPATIENT)
Dept: CT IMAGING | Facility: HOSPITAL | Age: 70
End: 2025-04-18
Payer: MEDICARE

## 2025-04-18 VITALS
HEART RATE: 73 BPM | SYSTOLIC BLOOD PRESSURE: 138 MMHG | DIASTOLIC BLOOD PRESSURE: 82 MMHG | WEIGHT: 145.4 LBS | OXYGEN SATURATION: 98 % | HEIGHT: 64 IN | BODY MASS INDEX: 24.82 KG/M2

## 2025-04-18 VITALS
SYSTOLIC BLOOD PRESSURE: 163 MMHG | BODY MASS INDEX: 24.75 KG/M2 | HEIGHT: 64 IN | DIASTOLIC BLOOD PRESSURE: 117 MMHG | WEIGHT: 145 LBS | HEART RATE: 58 BPM | TEMPERATURE: 98.2 F | RESPIRATION RATE: 16 BRPM | OXYGEN SATURATION: 95 %

## 2025-04-18 DIAGNOSIS — R11.0 NAUSEA: ICD-10-CM

## 2025-04-18 DIAGNOSIS — K58.0 IRRITABLE BOWEL SYNDROME WITH DIARRHEA: ICD-10-CM

## 2025-04-18 DIAGNOSIS — K92.1 MELENA: Primary | ICD-10-CM

## 2025-04-18 DIAGNOSIS — K52.9 ENTERITIS: Primary | ICD-10-CM

## 2025-04-18 LAB
ABO GROUP BLD: NORMAL
ALBUMIN SERPL-MCNC: 4.3 G/DL (ref 3.5–5.2)
ALBUMIN/GLOB SERPL: 1.3 G/DL
ALP SERPL-CCNC: 81 U/L (ref 39–117)
ALT SERPL W P-5'-P-CCNC: 10 U/L (ref 1–33)
ANION GAP SERPL CALCULATED.3IONS-SCNC: 10 MMOL/L (ref 5–15)
AST SERPL-CCNC: 15 U/L (ref 1–32)
BASOPHILS # BLD AUTO: 0.06 10*3/MM3 (ref 0–0.2)
BASOPHILS NFR BLD AUTO: 0.6 % (ref 0–1.5)
BILIRUB SERPL-MCNC: 0.5 MG/DL (ref 0–1.2)
BLD GP AB SCN SERPL QL: NEGATIVE
BUN SERPL-MCNC: 11 MG/DL (ref 8–23)
BUN/CREAT SERPL: 12.9 (ref 7–25)
CALCIUM SPEC-SCNC: 9.4 MG/DL (ref 8.6–10.5)
CHLORIDE SERPL-SCNC: 96 MMOL/L (ref 98–107)
CO2 SERPL-SCNC: 25 MMOL/L (ref 22–29)
CREAT SERPL-MCNC: 0.85 MG/DL (ref 0.57–1)
D-LACTATE SERPL-SCNC: 0.9 MMOL/L (ref 0.5–2)
DEPRECATED RDW RBC AUTO: 41.6 FL (ref 37–54)
EGFRCR SERPLBLD CKD-EPI 2021: 75.2 ML/MIN/1.73
EOSINOPHIL # BLD AUTO: 0.09 10*3/MM3 (ref 0–0.4)
EOSINOPHIL NFR BLD AUTO: 0.9 % (ref 0.3–6.2)
ERYTHROCYTE [DISTWIDTH] IN BLOOD BY AUTOMATED COUNT: 13.3 % (ref 12.3–15.4)
GLOBULIN UR ELPH-MCNC: 3.4 GM/DL
GLUCOSE SERPL-MCNC: 101 MG/DL (ref 65–99)
HCT VFR BLD AUTO: 47.3 % (ref 34–46.6)
HGB BLD-MCNC: 15.8 G/DL (ref 12–15.9)
HOLD SPECIMEN: NORMAL
IMM GRANULOCYTES # BLD AUTO: 0.04 10*3/MM3 (ref 0–0.05)
IMM GRANULOCYTES NFR BLD AUTO: 0.4 % (ref 0–0.5)
LYMPHOCYTES # BLD AUTO: 3.01 10*3/MM3 (ref 0.7–3.1)
LYMPHOCYTES NFR BLD AUTO: 30.4 % (ref 19.6–45.3)
MCH RBC QN AUTO: 28.5 PG (ref 26.6–33)
MCHC RBC AUTO-ENTMCNC: 33.4 G/DL (ref 31.5–35.7)
MCV RBC AUTO: 85.2 FL (ref 79–97)
MONOCYTES # BLD AUTO: 0.52 10*3/MM3 (ref 0.1–0.9)
MONOCYTES NFR BLD AUTO: 5.3 % (ref 5–12)
NEUTROPHILS NFR BLD AUTO: 6.18 10*3/MM3 (ref 1.7–7)
NEUTROPHILS NFR BLD AUTO: 62.4 % (ref 42.7–76)
NRBC BLD AUTO-RTO: 0 /100 WBC (ref 0–0.2)
PLATELET # BLD AUTO: 302 10*3/MM3 (ref 140–450)
PMV BLD AUTO: 8.4 FL (ref 6–12)
POTASSIUM SERPL-SCNC: 4.2 MMOL/L (ref 3.5–5.2)
PROT SERPL-MCNC: 7.7 G/DL (ref 6–8.5)
RBC # BLD AUTO: 5.55 10*6/MM3 (ref 3.77–5.28)
RH BLD: NEGATIVE
SODIUM SERPL-SCNC: 131 MMOL/L (ref 136–145)
T&S EXPIRATION DATE: NORMAL
WBC NRBC COR # BLD AUTO: 9.9 10*3/MM3 (ref 3.4–10.8)
WHOLE BLOOD HOLD COAG: NORMAL
WHOLE BLOOD HOLD SPECIMEN: NORMAL

## 2025-04-18 PROCEDURE — 86900 BLOOD TYPING SEROLOGIC ABO: CPT | Performed by: STUDENT IN AN ORGANIZED HEALTH CARE EDUCATION/TRAINING PROGRAM

## 2025-04-18 PROCEDURE — 99285 EMERGENCY DEPT VISIT HI MDM: CPT

## 2025-04-18 PROCEDURE — 86901 BLOOD TYPING SEROLOGIC RH(D): CPT | Performed by: STUDENT IN AN ORGANIZED HEALTH CARE EDUCATION/TRAINING PROGRAM

## 2025-04-18 PROCEDURE — 96374 THER/PROPH/DIAG INJ IV PUSH: CPT

## 2025-04-18 PROCEDURE — 99213 OFFICE O/P EST LOW 20 MIN: CPT | Performed by: PHYSICIAN ASSISTANT

## 2025-04-18 PROCEDURE — 86901 BLOOD TYPING SEROLOGIC RH(D): CPT

## 2025-04-18 PROCEDURE — 25010000002 MORPHINE PER 10 MG: Performed by: STUDENT IN AN ORGANIZED HEALTH CARE EDUCATION/TRAINING PROGRAM

## 2025-04-18 PROCEDURE — 74177 CT ABD & PELVIS W/CONTRAST: CPT

## 2025-04-18 PROCEDURE — 96375 TX/PRO/DX INJ NEW DRUG ADDON: CPT

## 2025-04-18 PROCEDURE — 25510000001 IOPAMIDOL 61 % SOLUTION: Performed by: STUDENT IN AN ORGANIZED HEALTH CARE EDUCATION/TRAINING PROGRAM

## 2025-04-18 PROCEDURE — 86900 BLOOD TYPING SEROLOGIC ABO: CPT

## 2025-04-18 PROCEDURE — 83605 ASSAY OF LACTIC ACID: CPT | Performed by: STUDENT IN AN ORGANIZED HEALTH CARE EDUCATION/TRAINING PROGRAM

## 2025-04-18 PROCEDURE — 85025 COMPLETE CBC W/AUTO DIFF WBC: CPT | Performed by: STUDENT IN AN ORGANIZED HEALTH CARE EDUCATION/TRAINING PROGRAM

## 2025-04-18 PROCEDURE — 80053 COMPREHEN METABOLIC PANEL: CPT | Performed by: STUDENT IN AN ORGANIZED HEALTH CARE EDUCATION/TRAINING PROGRAM

## 2025-04-18 PROCEDURE — 86850 RBC ANTIBODY SCREEN: CPT | Performed by: STUDENT IN AN ORGANIZED HEALTH CARE EDUCATION/TRAINING PROGRAM

## 2025-04-18 PROCEDURE — 25810000003 SODIUM CHLORIDE 0.9 % SOLUTION

## 2025-04-18 PROCEDURE — 36415 COLL VENOUS BLD VENIPUNCTURE: CPT

## 2025-04-18 PROCEDURE — 25010000002 ONDANSETRON PER 1 MG: Performed by: STUDENT IN AN ORGANIZED HEALTH CARE EDUCATION/TRAINING PROGRAM

## 2025-04-18 RX ORDER — SODIUM CHLORIDE 0.9 % (FLUSH) 0.9 %
10 SYRINGE (ML) INJECTION AS NEEDED
Status: DISCONTINUED | OUTPATIENT
Start: 2025-04-18 | End: 2025-04-18 | Stop reason: HOSPADM

## 2025-04-18 RX ORDER — DICYCLOMINE HYDROCHLORIDE 10 MG/1
10 CAPSULE ORAL 3 TIMES DAILY PRN
Qty: 12 CAPSULE | Refills: 0 | Status: SHIPPED | OUTPATIENT
Start: 2025-04-18

## 2025-04-18 RX ORDER — LEVOTHYROXINE SODIUM 88 UG/1
88 TABLET ORAL
Qty: 90 TABLET | Refills: 2 | Status: CANCELLED | OUTPATIENT
Start: 2025-04-18

## 2025-04-18 RX ORDER — ONDANSETRON 4 MG/1
4 TABLET, ORALLY DISINTEGRATING ORAL 4 TIMES DAILY PRN
Qty: 20 TABLET | Refills: 0 | Status: SHIPPED | OUTPATIENT
Start: 2025-04-18

## 2025-04-18 RX ORDER — DICYCLOMINE HYDROCHLORIDE 10 MG/1
20 CAPSULE ORAL ONCE
Status: COMPLETED | OUTPATIENT
Start: 2025-04-18 | End: 2025-04-18

## 2025-04-18 RX ORDER — ONDANSETRON 2 MG/ML
4 INJECTION INTRAMUSCULAR; INTRAVENOUS ONCE
Status: COMPLETED | OUTPATIENT
Start: 2025-04-18 | End: 2025-04-18

## 2025-04-18 RX ORDER — MORPHINE SULFATE 4 MG/ML
4 INJECTION, SOLUTION INTRAMUSCULAR; INTRAVENOUS ONCE
Status: COMPLETED | OUTPATIENT
Start: 2025-04-18 | End: 2025-04-18

## 2025-04-18 RX ORDER — IOPAMIDOL 612 MG/ML
85 INJECTION, SOLUTION INTRAVASCULAR
Status: COMPLETED | OUTPATIENT
Start: 2025-04-18 | End: 2025-04-18

## 2025-04-18 RX ADMIN — SODIUM CHLORIDE 1000 ML: 9 INJECTION, SOLUTION INTRAVENOUS at 20:35

## 2025-04-18 RX ADMIN — DICYCLOMINE HYDROCHLORIDE 20 MG: 10 CAPSULE ORAL at 21:11

## 2025-04-18 RX ADMIN — IOPAMIDOL 85 ML: 612 INJECTION, SOLUTION INTRAVENOUS at 18:26

## 2025-04-18 RX ADMIN — MORPHINE SULFATE 4 MG: 4 INJECTION, SOLUTION INTRAMUSCULAR; INTRAVENOUS at 20:35

## 2025-04-18 RX ADMIN — ONDANSETRON 4 MG: 2 INJECTION INTRAMUSCULAR; INTRAVENOUS at 16:48

## 2025-04-18 NOTE — PROGRESS NOTES
Office Note     Name: Kyra Nunez    : 1955     MRN: [id number]     Chief Complaint  Abdominal Pain (Off and on for 8 months)    Subjective   Patient or patient representative verbalized consent for the use of Ambient Listening during the visit with  Maddi Blanchard PA-C for chart documentation. 2025  13:34 EDT      Kyra Nunez is a 69 y.o. female.     The patient presents for evaluation of melena, migraines, and thyroid management.    Intermittent hot flashes for the past few days, occurring twice last year. Episodes start with a sensation in the stomach, leading to migraines, nausea, diarrhea, and constipation from medication. Whole body hot flashes persist for a week, causing incapacitation. Symptoms slightly diminish in the second week but alternating constipation and diarrhea, headaches, and nausea continue. Episodes last 2-3 weeks, recurring this year.    Consulted Dr. Cooper, who suspected ulcers previously. No recent gastroenterologist consultations. No confirmed ulcers, but black, tarry stools for 2-3 weeks, worsening over time. Diarrhea this morning and during previous episodes. No coffee ground vomiting or blood. Reports weakness, lightheadedness, dizziness. Long-term Celebrex use for arthritis. No Pepto-Bismol this year. Ensure unsuccessful for stomach discomfort. Poor appetite, feeling full after one bite. Unable to keep down food.     Missed thyroid medication for 3 days.    Discontinued Pristiq last year due to worsening migraines and nausea. Seeking alternative migraine treatment.    Review of Systems:   Review of Systems   HENT:  Positive for rhinorrhea.    Respiratory:  Positive for cough.        Past Medical History:   Past Medical History:   Diagnosis Date    Cole's esophagus     MEDICAL MANAGEMENT/[facility]    Depressive disorder     Heavy menses     FIBROIDS    Hypothyroidism (acquired)     IBS (irritable bowel syndrome)     DRUG THERAPY    Left breast  lump     EXCISION IN THE 70'S, BENIGN. NO FAMILY HISTORY. NEG ANNUAL BREAST EXAM/MAMMOGRAMS       Past Surgical History: History reviewed. No pertinent surgical history.    Family History:   Family History   Problem Relation Age of Onset    Lupus Sister         ERYTHEMATOSUS       Social History:   Social History     Socioeconomic History    Marital status:    Tobacco Use    Smoking status: Every Day     Current packs/day: 1.00     Average packs/day: 1 pack/day for 50.0 years (50.0 ttl pk-yrs)     Types: Cigarettes    Smokeless tobacco: Never   Vaping Use    Vaping status: Never Used   Substance and Sexual Activity    Alcohol use: Never    Drug use: Never    Sexual activity: Defer       Immunizations:   Immunization History   Administered Date(s) Administered    COVID-19 ([facility]) 09/15/2021    Fluzone High-Dose 65+yrs 01/17/2023    Hepatitis A 01/07/2019, 07/08/2019    Influenza, Unspecified 08/01/2016, 08/15/2017    Pneumococcal Conjugate 13-Valent (PCV13) 08/14/2017, 08/15/2017    Pneumococcal Conjugate 20-Valent (PCV20) 01/17/2023    Pneumococcal Polysaccharide (PPSV23) 05/20/2015    Tdap 08/15/2011    Zoster, Unspecified 01/15/2016        Medications:     Current Outpatient Medications:     celecoxib (CeleBREX) 200 MG capsule, Take 1 capsule by mouth Daily., Disp: 90 capsule, Rfl: 2    hyoscyamine (LEVSIN) 0.125 MG SL tablet, Place 1 tablet under the tongue Every 4 (Four) Hours As Needed for Cramping ((Caution constipation))., Disp: 30 tablet, Rfl: 6    levothyroxine (SYNTHROID, LEVOTHROID) 88 MCG tablet, Take 1 tablet by mouth Every Morning. (Note dose reduction to 88mcg based upon labs), Disp: 90 tablet, Rfl: 2    omeprazole (priLOSEC) 40 MG capsule, Take 1 capsule by mouth 2 (Two) Times a Day Before Meals. Take a half hour before breakfast, Disp: 180 capsule, Rfl: 2    rosuvastatin (Crestor) 20 MG tablet, Take 1 tablet by mouth Daily. For cholesterol and heart protection., Disp: 90 tablet, Rfl: 2     "famotidine (Pepcid) 40 MG tablet, Take 1 tablet by mouth Every Night. For stomach (Patient not taking: Reported on 4/18/2025), Disp: 90 tablet, Rfl: 2    Allergies:   Allergies   Allergen Reactions    Codeine Unknown - High Severity    Copper-Containing Compounds Unknown - High Severity    Penicillins Unknown - High Severity       Objective     Vital Signs  /82   Pulse 73   Ht 162.6 cm (64\")   Wt 66 kg (145 lb 6.4 oz)   SpO2 98%   BMI 24.96 kg/m²   Estimated body mass index is 24.96 kg/m² as calculated from the following:    Height as of this encounter: 162.6 cm (64\").    Weight as of this encounter: 66 kg (145 lb 6.4 oz).    BMI is within normal parameters. No other follow-up for BMI required.      Physical Exam  Vitals and nursing note reviewed.   Constitutional:       General: She is not in acute distress.     Appearance: She is ill-appearing.      Comments: Mild pallor   HENT:      Head: Normocephalic and atraumatic.   Cardiovascular:      Rate and Rhythm: Normal rate and regular rhythm.      Pulses: Normal pulses.      Heart sounds: Normal heart sounds.   Pulmonary:      Effort: Pulmonary effort is normal. No respiratory distress.      Breath sounds: Normal breath sounds.   Musculoskeletal:      Right lower leg: No edema.      Left lower leg: No edema.   Skin:     General: Skin is warm and dry.   Neurological:      General: No focal deficit present.      Mental Status: She is alert and oriented to person, place, and time.      Coordination: Coordination normal.      Gait: Gait normal.   Psychiatric:         Mood and Affect: Mood normal.         Behavior: Behavior normal.            Results:  No results found for this or any previous visit (from the past 24 hours).   Assessment and Plan       Diagnoses and all orders for this visit:    1. Melena (Primary)  Assessment & Plan:  - Possible GI bleed, exacerbated by long-term Celebrex use.  - Black, tarry stools indicate significant blood loss from upper " GI tract.  - Advised immediate ER visit for evaluation and management, including blood count and potential transfusion.  - Informed of anti-inflammatory medication risks, including GI bleeds or ulcers. Advised to hold Celebrex until symptom cause determined.          Follow Up  No follow-ups on file. Needs f/u with PCP after ER.     Maddi Blanchard PA-C  [facility] Internal Medicine [facility]

## 2025-04-18 NOTE — ASSESSMENT & PLAN NOTE
- Possible GI bleed, exacerbated by long-term Celebrex use.  - Black, tarry stools indicate significant blood loss from upper GI tract.  - Advised immediate ER visit for evaluation and management, including blood count and potential transfusion.  - Informed of anti-inflammatory medication risks, including GI bleeds or ulcers. Advised to hold Celebrex until symptom cause determined.

## 2025-04-18 NOTE — ED PROVIDER NOTES
"Subjective   History of Present Illness patient is a 69-year-old female come to by her , advised to the ED by her primary care provider for dark stools she was reported intermittently for the last 1 year.  She endorses history of IBS, has been prescribed high Cosamin, which has helped the abdominal spasms, but not effective over the last 3 weeks.  Patient reports this is the third week of a bandlike mid gastric abdominal pain, as well as dark stools, which she reports in loose stools this morning, at other times formed however.  Her  endorses she has been more pale but her color improved in the last few days he reports.  Patient states, \"I stay nauseous\".  Patient follows with Dr. Mejía's, had a colonoscopy last year.  Patient also endorses early satiety, weight gain of 5 pounds in the last 4 months.    Review of Systems   Constitutional: Negative.    Respiratory: Negative.     Cardiovascular: Negative.    Gastrointestinal:  Positive for abdominal pain, blood in stool, diarrhea and nausea.   Genitourinary: Negative.    Musculoskeletal: Negative.    Skin: Negative.    Neurological: Negative.        Past Medical History:   Diagnosis Date    Cole's esophagus     MEDICAL MANAGEMENT/[facility]    Depressive disorder     Heavy menses     FIBROIDS    Hypothyroidism (acquired)     IBS (irritable bowel syndrome)     DRUG THERAPY    Left breast lump     EXCISION IN THE 70'S, BENIGN. NO FAMILY HISTORY. NEG ANNUAL BREAST EXAM/MAMMOGRAMS       Allergies   Allergen Reactions    Codeine Unknown - High Severity    Copper-Containing Compounds Unknown - High Severity    Penicillins Unknown - High Severity       No past surgical history on file.    Family History   Problem Relation Age of Onset    Lupus Sister         ERYTHEMATOSUS       Social History     Socioeconomic History    Marital status:    Tobacco Use    Smoking status: Every Day     Current packs/day: 1.00     Average packs/day: 1 " pack/day for 50.0 years (50.0 ttl pk-yrs)     Types: Cigarettes    Smokeless tobacco: Never   Vaping Use    Vaping status: Never Used   Substance and Sexual Activity    Alcohol use: Never    Drug use: Never    Sexual activity: Defer           Objective   Physical Exam  Constitutional:       Appearance: Normal appearance. She is ill-appearing.   HENT:      Head: Normocephalic and atraumatic.   Eyes:      Extraocular Movements: Extraocular movements intact.      Pupils: Pupils are equal, round, and reactive to light.   Cardiovascular:      Rate and Rhythm: Normal rate.      Pulses: Normal pulses.   Pulmonary:      Effort: Pulmonary effort is normal.   Abdominal:      General: Abdomen is flat. Bowel sounds are normal.      Palpations: Abdomen is soft.      Tenderness: There is no abdominal tenderness. There is no right CVA tenderness, left CVA tenderness or guarding.   Musculoskeletal:         General: Normal range of motion.   Skin:     General: Skin is warm and dry.      Capillary Refill: Capillary refill takes less than 2 seconds.   Neurological:      General: No focal deficit present.      Mental Status: She is alert and oriented to person, place, and time.         Procedures           ED Course  ED Course as of 04/18/25 2106 Fri Apr 18, 2025   1544 Patient initially evaluated in the ED pit area, ambulates without difficulty, accompanied by her . [JH]   1553 Patient escorted to the ED waiting room. [JH]   1851 CBC nonactionable.  Lactate is negative, serum chemistry with mild hyponatremia. [JH]   1925 CT imaging study demonstrates mildly dilated loops of small bowel about the mid gastrum, which can be consistent with enteritis versus a partial small bowel obstruction.  Will reevaluate the patient, confirm her bowel pattern history. [JH]   2056 Reevaluated the patient, she reports she has much improved abdominal pain after medication.  She confirms that she is continue to have daily bowel movements,  sometimes formed sometimes loose over the last 3 weeks.  She will follow-up with her gastroenterologist Dr. Mejía's. []      ED Course User Index  [] Brannon Donahue APRN                                                       Medical Decision Making  Given the patient's complaints, their chronicity, her prior reported dark stools and pallor, and history of IBS, as well as my exam, cannot exclude gastrointestinal bleeding, anemia, diverticulitis, colitis, urinary tract infection, cholecystitis, choledocholithiasis, hepatic steatosis.  Patient will have serum screening labs, urinalysis, CT imaging abdomen pelvis with IV contrast.  We will reevaluate for improvement after administering IV analgesia and antiemetic medications, communicate workup results and plan her disposition.  Patient is agreeable with this plan.  We will consider consultation and referral to gastroenterology specialist, of which she is established.    Amount and/or Complexity of Data Reviewed  Labs: ordered.    Risk  Prescription drug management.        Final diagnoses:   Enteritis   Irritable bowel syndrome with diarrhea   Nausea       ED Disposition  ED Disposition       ED Disposition   Discharge    Condition   Stable    Comment   --               Theron Cooper MD  [address] [place] [address] [phone]    Call       South Eckert MD  [address]  [facility] [address] [phone]    Call            Medication List        New Prescriptions      dicyclomine 10 MG capsule  Commonly known as: BENTYL  Take 1 capsule by mouth 3 (Three) Times a Day As Needed for Abdominal Cramping.     ondansetron ODT 4 MG disintegrating tablet  Commonly known as: ZOFRAN-ODT  Take 1 tablet by mouth 4 (Four) Times a Day As Needed for Nausea.               Where to Get Your Medications        These medications were sent to [id number] - [place], [place] - [address] [address] [facility] [address] - [phone]  - [phone] FX  [address] [facility] [address] [address]      Phone: [phone]   dicyclomine 10 MG capsule  ondansetron ODT 4 MG disintegrating tablet            Brannon Donahue, APRN  04/19/25 0026

## 2025-04-19 ENCOUNTER — TELEPHONE (OUTPATIENT)
Dept: FAMILY MEDICINE CLINIC | Facility: CLINIC | Age: 70
End: 2025-04-19
Payer: MEDICARE

## 2025-04-19 NOTE — DISCHARGE INSTRUCTIONS
Call your gastroenterologist office and schedule an outpatient appointment.  Return to the ED for new or concerning symptoms symptoms, for example not going to the bathroom, fever, pain when walking or riding in a vehicle over speed bumps in your abdomen.

## 2025-04-19 NOTE — TELEPHONE ENCOUNTER
levothyroxine (SYNTHROID, LEVOTHROID) 88 MCG tablet       Pt called with questions regarding medication above.  Also wanted to know if ED sent records.  Asking to speak with Maddi.

## 2025-04-21 ENCOUNTER — TELEPHONE (OUTPATIENT)
Dept: FAMILY MEDICINE CLINIC | Facility: CLINIC | Age: 70
End: 2025-04-21

## 2025-04-21 DIAGNOSIS — E03.9 HYPOTHYROIDISM (ACQUIRED): ICD-10-CM

## 2025-04-21 RX ORDER — LEVOTHYROXINE SODIUM 88 UG/1
88 TABLET ORAL
Qty: 90 TABLET | Refills: 0 | Status: SHIPPED | OUTPATIENT
Start: 2025-04-21

## 2025-04-21 NOTE — TELEPHONE ENCOUNTER
Addended by: Sindi St on: 1/4/2021 06:16 PM     Modules accepted: Orders Needs appointment - I have not seen her since March 2024 - please have her schedule.  I did send in a refill on her thyroid med but she needs to setup a followup

## 2025-04-21 NOTE — TELEPHONE ENCOUNTER
Caller: Kyra Nunez    Relationship: Self    Best call back number: [phone]    Requested Prescriptions:   Requested Prescriptions     Pending Prescriptions Disp Refills    levothyroxine (SYNTHROID, LEVOTHROID) 88 MCG tablet 90 tablet 2     Sig: Take 1 tablet by mouth Every Morning. (Note dose reduction to 88mcg based upon labs)        Pharmacy where request should be sent: [id number] - [place], [place] - [address] [facility] [phone] [facility] [phone] FX     Last office visit with prescribing clinician: 3/26/2024   Last telemedicine visit with prescribing clinician: Visit date not found   Next office visit with prescribing clinician: Visit date not found     Additional details provided by patient: PATIENT IS COMPLETELY OUT    Does the patient have less than a 3 day supply:  [x] Yes  [] No    Would you like a call back once the refill request has been completed: [] Yes [x] No    If the office needs to give you a call back, can they leave a voicemail: [] Yes [x] No    Rene Saldana Rep   04/21/25 09:42 EDT

## 2025-04-21 NOTE — TELEPHONE ENCOUNTER
Caller: Kyra Nunez    Relationship: Self    Best call back number: [phone]    What is the best time to reach you: ANYTIME    Who are you requesting to speak with (clinical staff, provider,  specific staff member): CLINICAL STAFF    What was the call regarding: WOULD LIKE TO KNOW IF THE GASTROENTEROLOGIST DOCTOR ON "[employer] WOULD BE GOOD TO SEE. PLEASE ADVISE    Is it okay if the provider responds through MyChart: NO

## 2025-04-21 NOTE — TELEPHONE ENCOUNTER
Called patient and left message for patient to call back. What questions does she have about the medication? HUB CAN RELAY

## 2025-04-22 NOTE — TELEPHONE ENCOUNTER
2nd attempt - left     HUB TO RELAY ---    What question's does the patient have about her medications?   Please let the patient know we received her ER records from 4/18/25

## 2025-04-23 NOTE — TELEPHONE ENCOUNTER
3rd attempt - left      HUB TO RELAY ---     What question's does the patient have about her medications?   Please let the patient know we received her ER records from 4/18/25

## 2025-04-29 ENCOUNTER — OFFICE VISIT (OUTPATIENT)
Dept: FAMILY MEDICINE CLINIC | Facility: CLINIC | Age: 70
End: 2025-04-29
Payer: MEDICARE

## 2025-04-29 VITALS
SYSTOLIC BLOOD PRESSURE: 112 MMHG | BODY MASS INDEX: 25.3 KG/M2 | DIASTOLIC BLOOD PRESSURE: 68 MMHG | OXYGEN SATURATION: 97 % | WEIGHT: 148.2 LBS | HEIGHT: 64 IN | HEART RATE: 67 BPM

## 2025-04-29 DIAGNOSIS — K22.70 BARRETT'S ESOPHAGUS WITHOUT DYSPLASIA: Chronic | ICD-10-CM

## 2025-04-29 DIAGNOSIS — K52.9 ENTERITIS: ICD-10-CM

## 2025-04-29 DIAGNOSIS — J30.2 SEASONAL ALLERGIES: ICD-10-CM

## 2025-04-29 DIAGNOSIS — K58.0 IRRITABLE BOWEL SYNDROME WITH DIARRHEA: Chronic | ICD-10-CM

## 2025-04-29 DIAGNOSIS — Z09 HOSPITAL DISCHARGE FOLLOW-UP: Primary | ICD-10-CM

## 2025-04-29 PROCEDURE — 99214 OFFICE O/P EST MOD 30 MIN: CPT | Performed by: PHYSICIAN ASSISTANT

## 2025-04-29 RX ORDER — LEVOCETIRIZINE DIHYDROCHLORIDE 5 MG/1
5 TABLET, FILM COATED ORAL EVERY EVENING
Qty: 30 TABLET | Refills: 2 | Status: SHIPPED | OUTPATIENT
Start: 2025-04-29

## 2025-04-29 NOTE — PROGRESS NOTES
Office Note     Name: Kyra Nunez    : 1955     MRN: [id number]     Chief Complaint  Irritable Bowel Syndrome (Hospital follow up)    Subjective   Patient or patient representative verbalized consent for the use of Ambient Listening during the visit with  Maddi Blanchard PA-C for chart documentation. 2025 00:00 EDT        Kyra Nunez is a 69 y.o. female.     The patient presents for evaluation of irritable bowel syndrome (IBS) and mucus drainage.    She reports difficulty tolerating food, particularly milk, which causes discomfort. She has abstained from milk and attempted to reintroduce white bread into her diet. Current IBS symptoms are more severe than previous episodes. Constipation was experienced last week, potentially due to a new medication, but improvement was noted on . Bowel movements are irregular but gradually improving. No diarrhea has occurred. Straining during a bowel movement on  due to constipation was reported, but no difficulties since then. Hemorrhoids are typically developed, but no recent episodes. No known issues with dairy products. Dicyclomine has been beneficial, although some pain persists. Levsin is also taken, but dicyclomine is more effective.    Mucus drainage is reported, believed to cause coughing. Over-the-counter Mucinex and allergy medication have been ineffective. Occasional difficulty swallowing is reported, with a history of esophageal dilation. Persistent cough for several weeks, initially presenting as a tickle in the throat.    PAST SURGICAL HISTORY:  Esophageal dilation    Review of Systems:   Review of Systems   All other systems reviewed and are negative.      Past Medical History:   Past Medical History:   Diagnosis Date    Cole's esophagus     MEDICAL MANAGEMENT/[facility] ENDOS[facility]Y    Depressive disorder     Heavy menses     FIBROIDS    Hypothyroidism (acquired)     IBS (irritable bowel syndrome)     DRUG THERAPY    Left  breast lump     EXCISION IN THE 70'S, BENIGN. NO FAMILY HISTORY. NEG ANNUAL BREAST EXAM/MAMMOGRAMS       Past Surgical History: History reviewed. No pertinent surgical history.    Family History:   Family History   Problem Relation Age of Onset    Lupus Sister         ERYTHEMATOSUS       Social History:   Social History     Socioeconomic History    Marital status:    Tobacco Use    Smoking status: Every Day     Current packs/day: 1.00     Average packs/day: 1 pack/day for 50.0 years (50.0 ttl pk-yrs)     Types: Cigarettes    Smokeless tobacco: Never   Vaping Use    Vaping status: Never Used   Substance and Sexual Activity    Alcohol use: Never    Drug use: Never    Sexual activity: Defer       Immunizations:   Immunization History   Administered Date(s) Administered    COVID-19 ([employer]) 09/15/2021    Fluzone High-Dose 65+yrs 01/17/2023    Hepatitis A 01/07/2019, 07/08/2019    Influenza, Unspecified 08/01/2016, 08/15/2017    Pneumococcal Conjugate 13-Valent (PCV13) 08/14/2017, 08/15/2017    Pneumococcal Conjugate 20-Valent (PCV20) 01/17/2023    Pneumococcal Polysaccharide (PPSV23) 05/20/2015    Tdap 08/15/2011    Zoster, Unspecified 01/15/2016        Medications:     Current Outpatient Medications:     celecoxib (CeleBREX) 200 MG capsule, Take 1 capsule by mouth Daily., Disp: 90 capsule, Rfl: 2    dicyclomine (BENTYL) 10 MG capsule, Take 1 capsule by mouth 3 (Three) Times a Day As Needed for Abdominal Cramping., Disp: 90 capsule, Rfl: 0    levothyroxine (SYNTHROID, LEVOTHROID) 88 MCG tablet, Take 1 tablet by mouth Every Morning. (Note dose reduction to 88mcg based upon labs), Disp: 90 tablet, Rfl: 0    omeprazole (priLOSEC) 40 MG capsule, Take 1 capsule by mouth 2 (Two) Times a Day Before Meals. Take a half hour before breakfast, Disp: 180 capsule, Rfl: 2    ondansetron ODT (ZOFRAN-ODT) 4 MG disintegrating tablet, Take 1 tablet by mouth 4 (Four) Times a Day As Needed for Nausea., Disp: 20 tablet, Rfl: 0     "rosuvastatin (Crestor) 20 MG tablet, Take 1 tablet by mouth Daily. For cholesterol and heart protection., Disp: 90 tablet, Rfl: 2    famotidine (Pepcid) 40 MG tablet, Take 1 tablet by mouth Every Night. For stomach (Patient not taking: Reported on 4/29/2025), Disp: 90 tablet, Rfl: 2    levocetirizine (XYZAL) 5 MG tablet, Take 1 tablet by mouth Every Evening., Disp: 30 tablet, Rfl: 2    Allergies:   Allergies   Allergen Reactions    Codeine Unknown - High Severity    Copper-Containing Compounds Unknown - High Severity    Penicillins Unknown - High Severity       Objective     Vital Signs  /68   Pulse 67   Ht 162.6 cm (64.02\")   Wt 67.2 kg (148 lb 3.2 oz)   SpO2 97%   BMI 25.42 kg/m²   Estimated body mass index is 25.42 kg/m² as calculated from the following:    Height as of this encounter: 162.6 cm (64.02\").    Weight as of this encounter: 67.2 kg (148 lb 3.2 oz).        Physical Exam  Vitals and nursing note reviewed.   Constitutional:       General: She is not in acute distress.     Appearance: She is ill-appearing.      Comments: Mild pallor   HENT:      Head: Normocephalic and atraumatic.   Cardiovascular:      Rate and Rhythm: Normal rate and regular rhythm.      Pulses: Normal pulses.      Heart sounds: Normal heart sounds.   Pulmonary:      Effort: Pulmonary effort is normal. No respiratory distress.      Breath sounds: Normal breath sounds.   Musculoskeletal:      Right lower leg: No edema.      Left lower leg: No edema.   Skin:     General: Skin is warm and dry.   Neurological:      General: No focal deficit present.      Mental Status: She is alert and oriented to person, place, and time.      Coordination: Coordination normal.      Gait: Gait normal.   Psychiatric:         Mood and Affect: Mood normal.         Behavior: Behavior normal.            Results:  No results found for this or any previous visit (from the past 24 hours).       Assessment and Plan     Diagnoses and all orders for this " visit:    1. Hospital discharge follow-up (Primary)  Comments:  Rercords reviewed.    2. Enteritis  -     Ambulatory Referral to Gastroenterology  -     dicyclomine (BENTYL) 10 MG capsule; Take 1 capsule by mouth 3 (Three) Times a Day As Needed for Abdominal Cramping.  Dispense: 90 capsule; Refill: 0    3. Irritable bowel syndrome with diarrhea  Assessment & Plan:  - Severe flare-up, more intense than previous episodes.  - Dicyclomine effective in reducing pain but may cause constipation.  - Take dicyclomine as needed and monitor for constipation.  - Referral to gastroenterologist for further evaluation and management. Dietary handouts via [employer].    Orders:  -     Ambulatory Referral to Gastroenterology  -     dicyclomine (BENTYL) 10 MG capsule; Take 1 capsule by mouth 3 (Three) Times a Day As Needed for Abdominal Cramping.  Dispense: 90 capsule; Refill: 0    4. Cole's esophagus without dysplasia  -     Ambulatory Referral to Gastroenterology    5. Seasonal allergies  Assessment & Plan:  - Persistent mucus drainage, unrelieved by over-the-counter Mucinex.  - Prescribe allergy medication to be taken at bedtime or evening due to potential drowsiness.  - Continue Mucinex and increase water intake to thin mucus.    Orders:  -     levocetirizine (XYZAL) 5 MG tablet; Take 1 tablet by mouth Every Evening.  Dispense: 30 tablet; Refill: 2        Follow Up  No follow-ups on file.    Maddi Blanchard PA-C  [facility] Internal Medicine [facility]

## 2025-05-01 ENCOUNTER — TELEPHONE (OUTPATIENT)
Dept: FAMILY MEDICINE CLINIC | Facility: CLINIC | Age: 70
End: 2025-05-01
Payer: MEDICARE

## 2025-05-01 ENCOUNTER — PATIENT MESSAGE (OUTPATIENT)
Dept: FAMILY MEDICINE CLINIC | Facility: CLINIC | Age: 70
End: 2025-05-01
Payer: MEDICARE

## 2025-05-01 RX ORDER — DICYCLOMINE HYDROCHLORIDE 10 MG/1
10 CAPSULE ORAL 3 TIMES DAILY PRN
Qty: 90 CAPSULE | Refills: 0 | Status: SHIPPED | OUTPATIENT
Start: 2025-05-01

## 2025-05-12 PROBLEM — J30.2 SEASONAL ALLERGIES: Status: ACTIVE | Noted: 2025-05-12

## 2025-05-13 NOTE — ASSESSMENT & PLAN NOTE
- Persistent mucus drainage, unrelieved by over-the-counter Mucinex.  - Prescribe allergy medication to be taken at bedtime or evening due to potential drowsiness.  - Continue Mucinex and increase water intake to thin mucus.

## 2025-05-13 NOTE — ASSESSMENT & PLAN NOTE
- Severe flare-up, more intense than previous episodes.  - Dicyclomine effective in reducing pain but may cause constipation.  - Take dicyclomine as needed and monitor for constipation.  - Referral to gastroenterologist for further evaluation and management. Dietary handouts via [employer].

## 2025-06-04 DIAGNOSIS — E03.9 HYPOTHYROIDISM (ACQUIRED): ICD-10-CM

## 2025-06-04 RX ORDER — LEVOTHYROXINE SODIUM 88 UG/1
88 TABLET ORAL
Qty: 90 TABLET | Refills: 0 | OUTPATIENT
Start: 2025-06-04

## 2025-06-04 NOTE — TELEPHONE ENCOUNTER
D  Requested Prescriptions:   Requested Prescriptions     Pending Prescriptions Disp Refills    levothyroxine (SYNTHROID, LEVOTHROID) 88 MCG tablet 90 tablet 0     Sig: Take 1 tablet by mouth Every Morning. (Note dose reduction to 88mcg based upon labs)        Pharmacy where request should be sent:      Last office visit with prescribing clinician: 3/26/2024   Last telemedicine visit with prescribing clinician: Visit date not found   Next office visit with prescribing clinician: Visit date not found     Additional details provided by patient: sent by [employer]    Does the patient have less than a 3 day supply:  [] Yes  [] No    Would you like a call back once the refill request has been completed: [] Yes [] No    If the office needs to give you a call back, can they leave a voicemail: [] Yes [] No    Rene Banda Rep   06/04/25 07:54 EDT

## 2025-06-06 ENCOUNTER — TELEPHONE (OUTPATIENT)
Dept: FAMILY MEDICINE CLINIC | Facility: CLINIC | Age: 70
End: 2025-06-06
Payer: MEDICARE

## 2025-06-06 DIAGNOSIS — E03.9 HYPOTHYROIDISM (ACQUIRED): ICD-10-CM

## 2025-06-06 RX ORDER — LEVOTHYROXINE SODIUM 88 UG/1
88 TABLET ORAL
Qty: 90 TABLET | Refills: 0 | Status: SHIPPED | OUTPATIENT
Start: 2025-06-06

## 2025-06-06 NOTE — TELEPHONE ENCOUNTER
Mary said if she scheduled she will call in her meds until her appt called patient and got her scheduled for sept

## 2025-06-06 NOTE — TELEPHONE ENCOUNTER
Mary could you call in her stomach medicine and anxiety meds.  She scheduled an appointment in September.

## 2025-06-06 NOTE — TELEPHONE ENCOUNTER
Patient came in for her lab appt and had no lab orders told her we could schedule her an appt with dr ricci in september and she walked out and said she would call back    no reactions to medicines/no reactions to food

## 2025-07-02 ENCOUNTER — OUTSIDE FACILITY SERVICE (OUTPATIENT)
Dept: GASTROENTEROLOGY | Facility: CLINIC | Age: 70
End: 2025-07-02
Payer: MEDICARE

## 2025-07-30 DIAGNOSIS — J30.2 SEASONAL ALLERGIES: ICD-10-CM

## 2025-07-30 RX ORDER — LEVOCETIRIZINE DIHYDROCHLORIDE 5 MG/1
5 TABLET, FILM COATED ORAL EVERY EVENING
Qty: 30 TABLET | Refills: 2 | Status: SHIPPED | OUTPATIENT
Start: 2025-07-30